# Patient Record
Sex: FEMALE | Race: BLACK OR AFRICAN AMERICAN | NOT HISPANIC OR LATINO | Employment: UNEMPLOYED | URBAN - METROPOLITAN AREA
[De-identification: names, ages, dates, MRNs, and addresses within clinical notes are randomized per-mention and may not be internally consistent; named-entity substitution may affect disease eponyms.]

---

## 2021-01-03 ENCOUNTER — HOSPITAL ENCOUNTER (EMERGENCY)
Facility: HOSPITAL | Age: 21
Discharge: HOME | End: 2021-01-04
Attending: EMERGENCY MEDICINE | Admitting: EMERGENCY MEDICINE

## 2021-01-03 DIAGNOSIS — Z3A.01 LESS THAN 8 WEEKS GESTATION OF PREGNANCY: ICD-10-CM

## 2021-01-03 DIAGNOSIS — F32.A DEPRESSION WITH SUICIDAL IDEATION: Primary | ICD-10-CM

## 2021-01-03 DIAGNOSIS — R45.851 DEPRESSION WITH SUICIDAL IDEATION: Primary | ICD-10-CM

## 2021-01-03 LAB
AMPHET+METHAMPHET UR QL: NEGATIVE
B-HCG UR QL: POSITIVE
BARBITURATES UR QL SCN: NEGATIVE
BENZODIAZ UR QL SCN: NEGATIVE
CANNABINOIDS SERPL QL: POSITIVE
COCAINE UR QL: NEGATIVE
ETHANOL BLD-MCNC: <10 MG/DL (ref 0–10)
ETHANOL UR QL: <0.01 %
HCG INTACT+B SERPL-ACNC: NORMAL MIU/ML
METHADONE UR QL SCN: NEGATIVE
OPIATES UR QL: NEGATIVE
OXYCODONE UR QL SCN: NEGATIVE

## 2021-01-03 PROCEDURE — 90791 PSYCH DIAGNOSTIC EVALUATION: CPT

## 2021-01-03 PROCEDURE — 84702 CHORIONIC GONADOTROPIN TEST: CPT | Performed by: EMERGENCY MEDICINE

## 2021-01-03 PROCEDURE — 80307 DRUG TEST PRSMV CHEM ANLYZR: CPT | Performed by: EMERGENCY MEDICINE

## 2021-01-03 PROCEDURE — 99284 EMERGENCY DEPT VISIT MOD MDM: CPT

## 2021-01-03 PROCEDURE — 81025 URINE PREGNANCY TEST: CPT | Performed by: EMERGENCY MEDICINE

## 2021-01-03 PROCEDURE — 82077 ASSAY SPEC XCP UR&BREATH IA: CPT | Performed by: EMERGENCY MEDICINE

## 2021-01-03 RX ORDER — PRENATAL VIT NO.126/IRON/FOLIC 28MG-0.8MG
1 TABLET ORAL DAILY
COMMUNITY

## 2021-01-03 NOTE — ED NOTES
Pt states she called because she has had thoughts of never wanting to wake up, states she would overdose or cut herself to accomplish. States last suicide attempt was approx 1 year ago.     Terri Albert RN  01/03/21 2233

## 2021-01-03 NOTE — ED PROVIDER NOTES
EMERGENCY DEPARTMENT ENCOUNTER    Room Number:  12/12  Date of encounter:  1/3/2021  PCP: Provider, No Known  Historian: Patient and EMS      HPI:  Chief Complaint: Depression with suicidal thoughts      Context: Mame Singh is a 20 y.o. female who presents to the ED c/o depression with suicidal thoughts.  The patient is a 20-year-old who is approximately 6 weeks pregnant with her first baby.  She states she feels overwhelmed now and is depressed and has been having suicidal thoughts.  She states she called for help because she does not want to hurt herself.  She denies any attempt to hurt herself at this point.  However she states in the past she has tried to hurt herself with cutting herself and overdoses.      PAST MEDICAL HISTORY  Active Ambulatory Problems     Diagnosis Date Noted   • No Active Ambulatory Problems     Resolved Ambulatory Problems     Diagnosis Date Noted   • No Resolved Ambulatory Problems     No Additional Past Medical History         PAST SURGICAL HISTORY  No past surgical history on file.      FAMILY HISTORY  No family history on file.      SOCIAL HISTORY  Social History     Socioeconomic History   • Marital status: Single     Spouse name: Not on file   • Number of children: Not on file   • Years of education: Not on file   • Highest education level: Not on file         ALLERGIES  Patient has no known allergies.        REVIEW OF SYSTEMS  Review of Systems     Patient denies headache, chest chest pain, abdominal pain, cough, fevers, chills, vomiting, diarrhea or known COVID-19 exposure    All systems reviewed and negative except for those discussed in HPI.     PHYSICAL EXAM    I have reviewed the triage vital signs and nursing notes.    ED Triage Vitals [01/03/21 1836]   Temp Heart Rate Resp BP SpO2   97.4 °F (36.3 °C) 70 16 127/80 98 %      Temp src Heart Rate Source Patient Position BP Location FiO2 (%)   Tympanic Monitor Sitting Right arm --       GENERAL: 20-year-old well developed,  well nourished in no acute distress  HENT: NCAT, neck supple, trachea midline  EYES: no scleral icterus, PERRL, normal conjunctiva  CV: regular rhythm, regular rate, no murmur  RESPIRATORY: unlabored effort, CTAB  ABDOMEN: soft, non-tender, non-distended, bowel sounds present  MUSCULOSKELETAL: no gross deformity, no pedal edema, no calf tenderness  NEURO: alert,  sensory and motor function of extremities grossly intact, speech clear, mental status normal  SKIN: warm, dry, no rash  PSYCH:  Appropriate mood and affect      PPE  Pt does not present with symptoms for COVID19; however, I was wearing a mask and goggles throughout all patient interaction.    Vital signs and nursing notes reviewed.      LAB RESULTS  Recent Results (from the past 24 hour(s))   Pregnancy, Urine - Urine, Clean Catch    Collection Time: 01/03/21  6:52 PM    Specimen: Urine, Clean Catch   Result Value Ref Range    HCG, Urine QL Positive (A) Negative   Urine Drug Screen - Urine, Clean Catch    Collection Time: 01/03/21  6:52 PM    Specimen: Urine, Clean Catch   Result Value Ref Range    Amphet/Methamphet, Screen Negative Negative    Barbiturates Screen, Urine Negative Negative    Benzodiazepine Screen, Urine Negative Negative    Cocaine Screen, Urine Negative Negative    Opiate Screen Negative Negative    THC, Screen, Urine Positive (A) Negative    Methadone Screen, Urine Negative Negative    Oxycodone Screen, Urine Negative Negative   Ethanol    Collection Time: 01/03/21  7:07 PM    Specimen: Blood   Result Value Ref Range    Ethanol <10 0 - 10 mg/dL    Ethanol % <0.010 %   hCG, Quantitative, Pregnancy    Collection Time: 01/03/21  7:07 PM    Specimen: Blood   Result Value Ref Range    HCG Quantitative 57,421.00 mIU/mL       Ordered the above labs and independently reviewed the results.        RADIOLOGY  No Radiology Exams Resulted Within Past 24 Hours    I ordered the above noted radiological studies. Independently reviewed by me and discussed  with radiologist.  See dictation above for official radiology interpretation.      PROCEDURES    Procedures        MEDICATIONS GIVEN IN ER    Medications - No data to display      PROGRESS, DATA ANALYSIS, CONSULTS, AND MEDICAL DECISION MAKING    All labs have been independently reviewed by me.  All radiology studies have been reviewed by me and discussed with radiologist dictating report.   EKG's independently reviewed by me.  Discussion below represents my analysis of pertinent findings related to patient's condition, differential diagnosis, treatment plan and final disposition.      ED Course as of Jan 03 2251   Sun Jan 03, 2021 1953 Patient's hCG confirms she is pregnant.  Her alcohol level is negative.  She has THC in her system.  Her vital signs are stable and currently we are awaiting access evaluation.    [GP]   2203 Patient has been seen and cleared by access.  Patient is no longer suicidal.  The patient agrees with outpatient psychiatric follow-up.    [GP]   2244 The patient's sister will not come pick the patient up.  The patient states she has no place to go.  Access has come back down and now is reevaluating the patient in order to try to find a safe discharge for her.  The patient think she can stay with her grandmother but so far we have been able to reach her grandmother.  Thus I will turn the patient over to my partner Dr. Berry awaiting a safe placement per access.    [GP]      ED Course User Index  [GP] Rod Ortega MD             AS OF 22:51 EST VITALS:    BP - 149/78  HR - 88  TEMP - 97.4 °F (36.3 °C) (Tympanic)  02 SATS - 100%        DIAGNOSIS  Final diagnoses:   Depression with suicidal ideation   Less than 8 weeks gestation of pregnancy         DISPOSITION  Pending per access        EMR Dragon/Transcription disclaimer:   Much of this encounter note is an electronic transcription/translation of spoken language to printed text.       No scribe was used     Rod Ortega MD  01/03/21  8839

## 2021-01-03 NOTE — ED NOTES
Pt presents to ED via EMS from home. Pt complains of depression, and is 6 week pregnant. Pt called a hotline and they were able to contact EMS.      Jazmín Lei RN  01/03/21 3305

## 2021-01-04 VITALS
TEMPERATURE: 97.4 F | OXYGEN SATURATION: 99 % | DIASTOLIC BLOOD PRESSURE: 85 MMHG | SYSTOLIC BLOOD PRESSURE: 142 MMHG | HEART RATE: 58 BPM | RESPIRATION RATE: 16 BRPM

## 2021-01-04 NOTE — CONSULTS
"Access consult for 20 year old female seen for suicidal thoughts. Pt is 6 weeks pregnant, and was aware of pregnancy prior to ED stay. Pt is excited for the pregnancy and is looking forward to being a mother. Pt is currently living with her sister, and has been for the past 2 days. Prior to that, the patient was living with her boyfriend in her boyfriend's mother's house.     Pt was pleasant and cooperative throughout interview. Pt stated she is here seeking resources for a new therapist and psychiatrist. Pt states that tonight she was feeling \"overwhelmed and down\" and that she was making suicidal statements, was upset and tearful. Pt states her sister called EMS to bring her to the hospital. Pt states that she recently broke up with her boyfriend, who is the father of her baby, and that she was \"kicked out\" of the boyfriend's mother's home yesterday. She then asked her sister for help and has been living with her for less than 2 days.     Pt stated that she does not currently see a therapist or a psychiatrist, and that her ex-boyfriend deterred her from seeing one previously. Pt states that in the spring of 2019 she had an inpatient stay at [facility] for suicidal thoughts with no plan or attempt. Pt states she was being followed by [facility] following that stay, and was seeing a psychiatrist there. Pt was prescribed psychiatric medication at that point, however, by the summer of 2019, she had stop seeing [facility] and had stopped the psychiatric medication.    Pt states that prior to yesterday, she has not had suicidal thoughts in over a year, and that she has never made an attempt or had a plan to end her life. Resources provided to the patient for therapists and psychiatrists. Dr. Ortega agrees with plan to discharge home with sister with outpatient resources.     Pt called sister to ask if she could come pick her up her, and the sister requested to speak with Access RN. Access RN spoke with sister, Mary Carmen, " [phone], and the sister expressed concern regarding the patient being discharged. Sister does not want the patient discharged, and stated she was just at [employer] last night for threatening to OD on a bottle of pills. Sister states she is not the primary person involved with the patient's care, and that their grandmother, Nancy Pedraza, would know more information. Attempted to call Nancy 4 times, no answer thus far.     Pt's sister is refusing to allow her to come back to live with her. Access currently awaiting on a call back from the grandmother to see if discharge home with her would be possible.     Spoke with representative from [facility], who stated they will send out a dispatcher to speak with the patient and see if she is a candidate for transfer. Representative spoke with patient's ED RN, Terri, and stated that the patient is not a candidate for acceptance at their shelter.     Spoke with Marquise at [facility] who stated that shelter would not be appropriate for this patient as she would need to be up at 5am every morning, and would need to be out of the building by 8pm every evening. Marquise gave this clinician the numbers for [facility], and the [facility].    Called both [facility] ([phone]) and [facility] ([phone]), no answer to both of the numbers.     Spoke with Juan M at [facility], who stated that there are beds available for the patient, and that she must have hospital discharge papers in hand when arriving to the facility.

## 2021-01-04 NOTE — PROGRESS NOTES
Clinical Pharmacy Services: Medication History    Mame Singh is a 20 y.o. female presenting to [facility] for   Chief Complaint   Patient presents with   • Depression       She  has no past medical history on file.    Allergies as of 01/03/2021   • (No Known Allergies)       Medication information was obtained from: Patient   Pharmacy and Phone Number:     [id number] - [place], [place] - [address] AT [facility] [address] - [phone]  - [phone]   [address]  [facility] [address]  Phone: [phone] Fax: [phone]        Prior to Admission Medications     Prescriptions Last Dose Informant Patient Reported? Taking?    prenatal vitamin (prenatal, CLASSIC, vitamin) tablet  Self Yes Yes    Take 1 tablet by mouth Daily.            Medication notes: Patient states she only takes a prenatal vitamin at home.     This medication list is complete to the best of my knowledge as of 1/3/2021    Please call if questions.    Rebekah Mckinley PharmD Candidate 2021  Medication History Technician  [phone]    1/3/2021 20:02 EST

## 2021-04-14 ENCOUNTER — APPOINTMENT (OUTPATIENT)
Dept: ULTRASOUND IMAGING | Facility: HOSPITAL | Age: 21
End: 2021-04-14

## 2021-04-14 ENCOUNTER — HOSPITAL ENCOUNTER (OUTPATIENT)
Facility: HOSPITAL | Age: 21
Discharge: HOME | End: 2021-04-14
Attending: OBSTETRICS & GYNECOLOGY | Admitting: OBSTETRICS & GYNECOLOGY

## 2021-04-14 VITALS
HEIGHT: 66 IN | SYSTOLIC BLOOD PRESSURE: 109 MMHG | HEART RATE: 95 BPM | WEIGHT: 214.95 LBS | TEMPERATURE: 98.9 F | OXYGEN SATURATION: 98 % | BODY MASS INDEX: 34.55 KG/M2 | RESPIRATION RATE: 16 BRPM | DIASTOLIC BLOOD PRESSURE: 69 MMHG

## 2021-04-14 PROBLEM — Z34.90 CURRENTLY PREGNANT: Status: ACTIVE | Noted: 2021-04-14

## 2021-04-14 LAB
BILIRUB UR QL STRIP: NEGATIVE
CLARITY UR: CLEAR
COLOR UR: YELLOW
GLUCOSE UR STRIP-MCNC: NEGATIVE MG/DL
HGB UR QL STRIP.AUTO: NEGATIVE
KETONES UR QL STRIP: NEGATIVE
LEUKOCYTE ESTERASE UR QL STRIP.AUTO: NEGATIVE
NITRITE UR QL STRIP: NEGATIVE
PH UR STRIP.AUTO: 7 [PH] (ref 5–8)
PROT UR QL STRIP: ABNORMAL
SP GR UR STRIP: 1.03 (ref 1–1.03)
UROBILINOGEN UR QL STRIP: ABNORMAL

## 2021-04-14 PROCEDURE — G0463 HOSPITAL OUTPT CLINIC VISIT: HCPCS

## 2021-04-14 PROCEDURE — 76817 TRANSVAGINAL US OBSTETRIC: CPT

## 2021-04-14 PROCEDURE — 81003 URINALYSIS AUTO W/O SCOPE: CPT | Performed by: OBSTETRICS & GYNECOLOGY

## 2021-04-14 RX ORDER — LIDOCAINE HYDROCHLORIDE 10 MG/ML
5 INJECTION, SOLUTION EPIDURAL; INFILTRATION; INTRACAUDAL; PERINEURAL AS NEEDED
Status: DISCONTINUED | OUTPATIENT
Start: 2021-04-14 | End: 2021-04-14

## 2021-04-14 RX ORDER — SODIUM CHLORIDE 0.9 % (FLUSH) 0.9 %
3 SYRINGE (ML) INJECTION EVERY 12 HOURS SCHEDULED
Status: DISCONTINUED | OUTPATIENT
Start: 2021-04-14 | End: 2021-04-14

## 2021-04-14 RX ORDER — SODIUM CHLORIDE 0.9 % (FLUSH) 0.9 %
10 SYRINGE (ML) INJECTION AS NEEDED
Status: DISCONTINUED | OUTPATIENT
Start: 2021-04-14 | End: 2021-04-14

## 2021-04-15 NOTE — NON STRESS TEST
Pt c/o pelvic cramping    toco - no ctxs  Nl FHTs    CL - 3.29 on u/s no funneling  Nl ua    Enc to push po fluids (pt with no water today),  F/up with regular care provider

## 2021-04-15 NOTE — SIGNIFICANT NOTE
Called Dr. Butt at this time regarding patient. Patient  21/5 weeks gestation. Patient goes to [facility] on [address] but does not know the name of her OB. Patient stated she received late prenatal care, and had 1 appointment in the last week. Patient came in tonight with complaints of cramping that started at 4pm. Patient rates her pain 5. Patient denies having intercourse, leaking of fluid, or bleeding. Baby category 1 on monitor heart rate 155 and no contractions. Patient stated she has had some difficulty urinating and her urine is dark. Patient has not had any water to drink today. Patient has history of suicidal thought 1 year ago, but none currently. Patient is staying at women[facility] shelter here in [place].  Orders given to Po hydrate, UA, and ultrasound for cervical length.

## 2021-07-16 ENCOUNTER — TELEPHONE (OUTPATIENT)
Dept: SOCIAL WORK | Facility: HOSPITAL | Age: 21
End: 2021-07-16

## 2021-07-16 NOTE — TELEPHONE ENCOUNTER
Informed by [employer] rep (Isa Javier) that patient recently moved from [place] to [place] and due next month. In need of OB appointment and was uncertain of who to contact. Confirmed that patient had previously had Passport, but cancelled policy - applied for [place] Medicaid HPE (#[id number], effective 7/9-8/31). Contacted [facility] (Spoke to Anahy, [phone]). Scheduled appointment for 8/3/21 @ 12:45pm, Tuesday. Assigned to Dr. Kenney, but will be seen this day by NP. If any earlier openings arise, then patient will be contacted. Contacted patient and patient's grandmother (Lu Altamirano, [phone]) to advised of appointment date/time/location.     Phone communication or documentation only - no physical contact with patient or family.  YAMILETH Knox    Phone: [phone]  Cell: [phone]  Fax: [phone]  [email or web address]

## 2021-07-26 ENCOUNTER — HOSPITAL ENCOUNTER (OUTPATIENT)
Facility: HOSPITAL | Age: 21
Discharge: HOME | End: 2021-07-27
Attending: OBSTETRICS & GYNECOLOGY | Admitting: OBSTETRICS & GYNECOLOGY

## 2021-07-26 PROBLEM — Z34.90 PREGNANT: Status: ACTIVE | Noted: 2021-07-26

## 2021-07-26 PROCEDURE — G0463 HOSPITAL OUTPT CLINIC VISIT: HCPCS

## 2021-07-26 RX ORDER — FERROUS SULFATE 325(65) MG
325 TABLET ORAL
COMMUNITY

## 2021-07-27 VITALS
HEART RATE: 85 BPM | TEMPERATURE: 98.4 F | HEIGHT: 67 IN | OXYGEN SATURATION: 100 % | SYSTOLIC BLOOD PRESSURE: 113 MMHG | WEIGHT: 230.38 LBS | DIASTOLIC BLOOD PRESSURE: 63 MMHG | RESPIRATION RATE: 18 BRPM | BODY MASS INDEX: 36.16 KG/M2

## 2021-07-27 LAB
BACTERIA UR QL AUTO: ABNORMAL /HPF
BILIRUB UR QL STRIP: NEGATIVE
CLARITY UR: ABNORMAL
COLOR UR: YELLOW
GLUCOSE UR STRIP-MCNC: NEGATIVE MG/DL
HGB UR QL STRIP.AUTO: ABNORMAL
HYALINE CASTS UR QL AUTO: ABNORMAL /LPF
KETONES UR QL STRIP: NEGATIVE
LEUKOCYTE ESTERASE UR QL STRIP.AUTO: NEGATIVE
NITRITE UR QL STRIP: NEGATIVE
PH UR STRIP.AUTO: 6.5 [PH] (ref 5–8)
PROT UR QL STRIP: NEGATIVE
RBC # UR: ABNORMAL /HPF
REF LAB TEST METHOD: ABNORMAL
SP GR UR STRIP: 1.03 (ref 1–1.03)
SQUAMOUS #/AREA URNS HPF: ABNORMAL /HPF
UROBILINOGEN UR QL STRIP: ABNORMAL
WBC UR QL AUTO: ABNORMAL /HPF

## 2021-07-27 PROCEDURE — 87086 URINE CULTURE/COLONY COUNT: CPT | Performed by: OBSTETRICS & GYNECOLOGY

## 2021-07-27 PROCEDURE — 81001 URINALYSIS AUTO W/SCOPE: CPT | Performed by: OBSTETRICS & GYNECOLOGY

## 2021-07-28 ENCOUNTER — ANESTHESIA EVENT (OUTPATIENT)
Dept: LABOR AND DELIVERY | Facility: HOSPITAL | Age: 21
End: 2021-07-28

## 2021-07-28 ENCOUNTER — ANESTHESIA (OUTPATIENT)
Dept: LABOR AND DELIVERY | Facility: HOSPITAL | Age: 21
End: 2021-07-28

## 2021-07-28 ENCOUNTER — HOSPITAL ENCOUNTER (INPATIENT)
Facility: HOSPITAL | Age: 21
LOS: 2 days | Discharge: HOME | End: 2021-07-30
Attending: OBSTETRICS & GYNECOLOGY | Admitting: OBSTETRICS & GYNECOLOGY

## 2021-07-28 PROBLEM — O47.9 UTERINE CONTRACTIONS DURING PREGNANCY: Status: ACTIVE | Noted: 2021-07-28

## 2021-07-28 LAB
ABO GROUP BLD: NORMAL
BACTERIA SPEC AEROBE CULT: NORMAL
BLD GP AB SCN SERPL QL: NEGATIVE
DEPRECATED RDW RBC AUTO: 38.9 FL (ref 37–54)
ERYTHROCYTE [DISTWIDTH] IN BLOOD BY AUTOMATED COUNT: 13.5 % (ref 12.3–15.4)
HCT VFR BLD AUTO: 36.4 % (ref 34–46.6)
HGB BLD-MCNC: 12.2 G/DL (ref 12–15.9)
HIV1+2 AB SER QL: NORMAL
MCH RBC QN AUTO: 27.8 PG (ref 26.6–33)
MCHC RBC AUTO-ENTMCNC: 33.5 G/DL (ref 31.5–35.7)
MCV RBC AUTO: 82.9 FL (ref 79–97)
PLATELET # BLD AUTO: 385 10*3/MM3 (ref 140–450)
PMV BLD AUTO: 7.6 FL (ref 6–12)
RBC # BLD AUTO: 4.39 10*6/MM3 (ref 3.77–5.28)
RH BLD: POSITIVE
SARS-COV-2 RNA PNL SPEC NAA+PROBE: NOT DETECTED
T&S EXPIRATION DATE: NORMAL
WBC # BLD AUTO: 22.6 10*3/MM3 (ref 3.4–10.8)

## 2021-07-28 PROCEDURE — 86901 BLOOD TYPING SEROLOGIC RH(D): CPT | Performed by: OBSTETRICS & GYNECOLOGY

## 2021-07-28 PROCEDURE — 86900 BLOOD TYPING SEROLOGIC ABO: CPT

## 2021-07-28 PROCEDURE — 86901 BLOOD TYPING SEROLOGIC RH(D): CPT

## 2021-07-28 PROCEDURE — 25010000002 FENTANYL CITRATE (PF) 250 MCG/5ML SOLUTION: Performed by: ANESTHESIOLOGY

## 2021-07-28 PROCEDURE — 25010000002 PENICILLIN G POTASSIUM PER 600000 UNITS: Performed by: OBSTETRICS & GYNECOLOGY

## 2021-07-28 PROCEDURE — 87635 SARS-COV-2 COVID-19 AMP PRB: CPT | Performed by: OBSTETRICS & GYNECOLOGY

## 2021-07-28 PROCEDURE — 86592 SYPHILIS TEST NON-TREP QUAL: CPT | Performed by: OBSTETRICS & GYNECOLOGY

## 2021-07-28 PROCEDURE — 86850 RBC ANTIBODY SCREEN: CPT | Performed by: OBSTETRICS & GYNECOLOGY

## 2021-07-28 PROCEDURE — 85027 COMPLETE CBC AUTOMATED: CPT | Performed by: OBSTETRICS & GYNECOLOGY

## 2021-07-28 PROCEDURE — 25010000003 PENICILLIN G POTASSIUM PER 600000 UNITS: Performed by: OBSTETRICS & GYNECOLOGY

## 2021-07-28 PROCEDURE — 0KQM0ZZ REPAIR PERINEUM MUSCLE, OPEN APPROACH: ICD-10-PCS | Performed by: OBSTETRICS & GYNECOLOGY

## 2021-07-28 PROCEDURE — G0432 EIA HIV-1/HIV-2 SCREEN: HCPCS | Performed by: OBSTETRICS & GYNECOLOGY

## 2021-07-28 PROCEDURE — 86900 BLOOD TYPING SEROLOGIC ABO: CPT | Performed by: OBSTETRICS & GYNECOLOGY

## 2021-07-28 PROCEDURE — 88307 TISSUE EXAM BY PATHOLOGIST: CPT | Performed by: OBSTETRICS & GYNECOLOGY

## 2021-07-28 PROCEDURE — 25010000002 MORPHINE PER 10 MG: Performed by: OBSTETRICS & GYNECOLOGY

## 2021-07-28 RX ORDER — FENTANYL 0.2 MG/100ML-BUPIV 0.125%-NACL 0.9% EPIDURAL INJ 2/0.125 MCG/ML-%
SOLUTION INJECTION CONTINUOUS
Status: DISCONTINUED | OUTPATIENT
Start: 2021-07-28 | End: 2021-07-29

## 2021-07-28 RX ORDER — ONDANSETRON 2 MG/ML
4 INJECTION INTRAMUSCULAR; INTRAVENOUS EVERY 6 HOURS PRN
Status: DISCONTINUED | OUTPATIENT
Start: 2021-07-28 | End: 2021-07-29 | Stop reason: HOSPADM

## 2021-07-28 RX ORDER — OXYTOCIN-SODIUM CHLORIDE 0.9% IV SOLN 30 UNIT/500ML 30-0.9/5 UT/ML-%
999 SOLUTION INTRAVENOUS ONCE
Status: COMPLETED | OUTPATIENT
Start: 2021-07-28 | End: 2021-07-28

## 2021-07-28 RX ORDER — EPHEDRINE SULFATE 50 MG/ML
10 INJECTION, SOLUTION INTRAVENOUS
Status: DISCONTINUED | OUTPATIENT
Start: 2021-07-28 | End: 2021-07-29 | Stop reason: HOSPADM

## 2021-07-28 RX ORDER — METHYLERGONOVINE MALEATE 0.2 MG/ML
200 INJECTION INTRAVENOUS ONCE AS NEEDED
Status: DISCONTINUED | OUTPATIENT
Start: 2021-07-28 | End: 2021-07-29 | Stop reason: HOSPADM

## 2021-07-28 RX ORDER — MAGNESIUM CARB/ALUMINUM HYDROX 105-160MG
30 TABLET,CHEWABLE ORAL ONCE AS NEEDED
Status: DISCONTINUED | OUTPATIENT
Start: 2021-07-28 | End: 2021-07-29 | Stop reason: HOSPADM

## 2021-07-28 RX ORDER — IBUPROFEN 600 MG/1
600 TABLET ORAL ONCE
Status: COMPLETED | OUTPATIENT
Start: 2021-07-28 | End: 2021-07-28

## 2021-07-28 RX ORDER — MORPHINE SULFATE 4 MG/ML
4 INJECTION, SOLUTION INTRAMUSCULAR; INTRAVENOUS ONCE AS NEEDED
Status: COMPLETED | OUTPATIENT
Start: 2021-07-28 | End: 2021-07-28

## 2021-07-28 RX ORDER — FAMOTIDINE 10 MG/ML
20 INJECTION, SOLUTION INTRAVENOUS ONCE AS NEEDED
Status: DISCONTINUED | OUTPATIENT
Start: 2021-07-28 | End: 2021-07-29 | Stop reason: HOSPADM

## 2021-07-28 RX ORDER — FENTANYL CITRATE 50 UG/ML
INJECTION, SOLUTION INTRAMUSCULAR; INTRAVENOUS
Status: DISPENSED
Start: 2021-07-28 | End: 2021-07-29

## 2021-07-28 RX ORDER — ONDANSETRON 4 MG/1
4 TABLET, FILM COATED ORAL EVERY 6 HOURS PRN
Status: DISCONTINUED | OUTPATIENT
Start: 2021-07-28 | End: 2021-07-29 | Stop reason: HOSPADM

## 2021-07-28 RX ORDER — MISOPROSTOL 200 UG/1
800 TABLET ORAL AS NEEDED
Status: DISCONTINUED | OUTPATIENT
Start: 2021-07-28 | End: 2021-07-29 | Stop reason: HOSPADM

## 2021-07-28 RX ORDER — TRISODIUM CITRATE DIHYDRATE AND CITRIC ACID MONOHYDRATE 500; 334 MG/5ML; MG/5ML
30 SOLUTION ORAL ONCE
Status: DISCONTINUED | OUTPATIENT
Start: 2021-07-28 | End: 2021-07-29 | Stop reason: HOSPADM

## 2021-07-28 RX ORDER — FENTANYL 0.2 MG/100ML-BUPIV 0.125%-NACL 0.9% EPIDURAL INJ 2/0.125 MCG/ML-%
SOLUTION INJECTION CONTINUOUS PRN
Status: DISCONTINUED | OUTPATIENT
Start: 2021-07-28 | End: 2021-07-28 | Stop reason: SURG

## 2021-07-28 RX ORDER — DIPHENHYDRAMINE HYDROCHLORIDE 50 MG/ML
12.5 INJECTION INTRAMUSCULAR; INTRAVENOUS EVERY 8 HOURS PRN
Status: DISCONTINUED | OUTPATIENT
Start: 2021-07-28 | End: 2021-07-29 | Stop reason: HOSPADM

## 2021-07-28 RX ORDER — SODIUM CHLORIDE, SODIUM LACTATE, POTASSIUM CHLORIDE, CALCIUM CHLORIDE 600; 310; 30; 20 MG/100ML; MG/100ML; MG/100ML; MG/100ML
125 INJECTION, SOLUTION INTRAVENOUS CONTINUOUS
Status: DISCONTINUED | OUTPATIENT
Start: 2021-07-28 | End: 2021-07-29

## 2021-07-28 RX ORDER — OXYTOCIN-SODIUM CHLORIDE 0.9% IV SOLN 30 UNIT/500ML 30-0.9/5 UT/ML-%
250 SOLUTION INTRAVENOUS CONTINUOUS
Status: DISPENSED | OUTPATIENT
Start: 2021-07-28 | End: 2021-07-28

## 2021-07-28 RX ORDER — FENTANYL 0.2 MG/100ML-BUPIV 0.125%-NACL 0.9% EPIDURAL INJ 2/0.125 MCG/ML-%
SOLUTION INJECTION
Status: COMPLETED
Start: 2021-07-28 | End: 2021-07-28

## 2021-07-28 RX ORDER — OXYTOCIN-SODIUM CHLORIDE 0.9% IV SOLN 30 UNIT/500ML 30-0.9/5 UT/ML-%
125 SOLUTION INTRAVENOUS CONTINUOUS PRN
Status: COMPLETED | OUTPATIENT
Start: 2021-07-28 | End: 2021-07-28

## 2021-07-28 RX ORDER — CARBOPROST TROMETHAMINE 250 UG/ML
250 INJECTION, SOLUTION INTRAMUSCULAR AS NEEDED
Status: DISCONTINUED | OUTPATIENT
Start: 2021-07-28 | End: 2021-07-29 | Stop reason: HOSPADM

## 2021-07-28 RX ORDER — FENTANYL CITRATE 50 UG/ML
INJECTION, SOLUTION INTRAMUSCULAR; INTRAVENOUS
Status: COMPLETED | OUTPATIENT
Start: 2021-07-28 | End: 2021-07-28

## 2021-07-28 RX ORDER — ONDANSETRON 2 MG/ML
4 INJECTION INTRAMUSCULAR; INTRAVENOUS ONCE AS NEEDED
Status: DISCONTINUED | OUTPATIENT
Start: 2021-07-28 | End: 2021-07-29 | Stop reason: HOSPADM

## 2021-07-28 RX ORDER — OXYTOCIN-SODIUM CHLORIDE 0.9% IV SOLN 30 UNIT/500ML 30-0.9/5 UT/ML-%
SOLUTION INTRAVENOUS
Status: COMPLETED
Start: 2021-07-28 | End: 2021-07-28

## 2021-07-28 RX ADMIN — SODIUM CHLORIDE 5 MILLION UNITS: 900 INJECTION INTRAVENOUS at 14:33

## 2021-07-28 RX ADMIN — WITCH HAZEL 1 PAD: 500 SOLUTION RECTAL; TOPICAL at 23:50

## 2021-07-28 RX ADMIN — Medication 10 ML/HR: at 16:01

## 2021-07-28 RX ADMIN — SODIUM CHLORIDE, POTASSIUM CHLORIDE, SODIUM LACTATE AND CALCIUM CHLORIDE 1000 ML: 600; 310; 30; 20 INJECTION, SOLUTION INTRAVENOUS at 14:25

## 2021-07-28 RX ADMIN — FENTANYL CITRATE 25 MCG: 0.05 INJECTION, SOLUTION INTRAMUSCULAR; INTRAVENOUS at 16:19

## 2021-07-28 RX ADMIN — OXYTOCIN-SODIUM CHLORIDE 0.9% IV SOLN 30 UNIT/500ML 999 MILLI-UNITS/MIN: 30-0.9/5 SOLUTION at 21:39

## 2021-07-28 RX ADMIN — IBUPROFEN 600 MG: 600 TABLET, FILM COATED ORAL at 22:59

## 2021-07-28 RX ADMIN — OXYTOCIN 125 ML/HR: 10 INJECTION INTRAVENOUS at 23:00

## 2021-07-28 RX ADMIN — LIDOCAINE HYDROCHLORIDE 3 ML: 10; .005 INJECTION, SOLUTION EPIDURAL; INFILTRATION; INTRACAUDAL; PERINEURAL at 16:01

## 2021-07-28 RX ADMIN — OXYTOCIN 999 MILLI-UNITS/MIN: 10 INJECTION INTRAVENOUS at 21:39

## 2021-07-28 RX ADMIN — Medication 1 APPLICATION: at 23:51

## 2021-07-28 RX ADMIN — SODIUM CHLORIDE, POTASSIUM CHLORIDE, SODIUM LACTATE AND CALCIUM CHLORIDE 999 ML/HR: 600; 310; 30; 20 INJECTION, SOLUTION INTRAVENOUS at 16:06

## 2021-07-28 RX ADMIN — MORPHINE SULFATE 4 MG: 4 INJECTION INTRAVENOUS at 15:04

## 2021-07-28 RX ADMIN — PENICILLIN G POTASSIUM 2.5 MILLION UNITS: 20000000 INJECTION, POWDER, FOR SOLUTION INTRAVENOUS at 19:15

## 2021-07-28 NOTE — ANESTHESIA PREPROCEDURE EVALUATION
Anesthesia Evaluation     Patient summary reviewed and Nursing notes reviewed   NPO Solid Status: > 8 hours  NPO Liquid Status: > 8 hours           Airway   Mallampati: II  TM distance: >3 FB  Neck ROM: full  No difficulty expected  Dental - normal exam     Pulmonary - negative pulmonary ROS and normal exam    breath sounds clear to auscultation  Cardiovascular - normal exam  Exercise tolerance: unable to assess    ECG reviewed  Rhythm: regular  Rate: normal    (+) hypertension,       Neuro/Psych- negative ROS  GI/Hepatic/Renal/Endo - negative ROS     Musculoskeletal (-) negative ROS    Abdominal  - normal exam   Substance History - negative use     OB/GYN    (+) Pregnant,         Other - negative ROS                       Anesthesia Plan    ASA 2     CSE     intravenous induction     Anesthetic plan, all risks, benefits, and alternatives have been provided, discussed and informed consent has been obtained with: patient.

## 2021-07-28 NOTE — ANESTHESIA PROCEDURE NOTES
CSE Block      Patient reassessed immediately prior to procedure    Patient location during procedure: OB  Reason for block: procedure for pain  Staffing  Anesthesiologist: Zeus Lutz MD  Preanesthetic Checklist  Completed: patient identified, IV checked, site marked, risks and benefits discussed, surgical consent, monitors and equipment checked, pre-op evaluation and timeout performed  CSE  Patient position: sitting  Patient monitoring: blood pressure monitoring, continuous pulse oximetry and EKG  Procedures: landmark technique and palpation technique  Spinal Needle  Needle type: Sprotte tip   Needle gauge: 27 G  Approach: midline  Location: L3-4  Fluid Appearance: clear    Epidural Needle  Injection technique: RODRIGUEZ saline  Needle type: MiniMonos   Needle gauge: 18 G  Location: L3-L4  Loss of Resistance: 5cm  Cath Depth at Skin (cm): 10  Aspiration: negative  Test dose: negative  Epidural medications: fentaNYL Citrate (PF) (SUBLIMAZE) injection, 25 mcg    Catheter  Catheter type: end hole  Catheter size: 20 G  Assessment  Dressing:occlusive dressing applied and secured with tape  Pt Tolerance:patient tolerated the procedure well with no apparent complications  Complications:no  Additional Notes  Lido 2% used for skin local.  Lido 1.5% 3 ml used for test Dose.  Fentanyl 25mcg for intrathecal narcotic dose.  75 mcg wasted at end of case w RN.

## 2021-07-29 LAB
BASOPHILS # BLD AUTO: 0.1 10*3/MM3 (ref 0–0.2)
BASOPHILS NFR BLD AUTO: 0.4 % (ref 0–1.5)
DEPRECATED RDW RBC AUTO: 39.8 FL (ref 37–54)
EOSINOPHIL # BLD AUTO: 0 10*3/MM3 (ref 0–0.4)
EOSINOPHIL NFR BLD AUTO: 0.1 % (ref 0.3–6.2)
ERYTHROCYTE [DISTWIDTH] IN BLOOD BY AUTOMATED COUNT: 13.7 % (ref 12.3–15.4)
HCT VFR BLD AUTO: 32.2 % (ref 34–46.6)
HGB BLD-MCNC: 10.6 G/DL (ref 12–15.9)
LYMPHOCYTES # BLD AUTO: 2.6 10*3/MM3 (ref 0.7–3.1)
LYMPHOCYTES NFR BLD AUTO: 9.4 % (ref 19.6–45.3)
MCH RBC QN AUTO: 27.2 PG (ref 26.6–33)
MCHC RBC AUTO-ENTMCNC: 32.8 G/DL (ref 31.5–35.7)
MCV RBC AUTO: 83 FL (ref 79–97)
MONOCYTES # BLD AUTO: 1.7 10*3/MM3 (ref 0.1–0.9)
MONOCYTES NFR BLD AUTO: 6.3 % (ref 5–12)
NEUTROPHILS NFR BLD AUTO: 23.2 10*3/MM3 (ref 1.7–7)
NEUTROPHILS NFR BLD AUTO: 83.8 % (ref 42.7–76)
NRBC BLD AUTO-RTO: 0 /100 WBC (ref 0–0.2)
PLATELET # BLD AUTO: 330 10*3/MM3 (ref 140–450)
PMV BLD AUTO: 7.7 FL (ref 6–12)
RBC # BLD AUTO: 3.88 10*6/MM3 (ref 3.77–5.28)
RPR SER QL: NORMAL
WBC # BLD AUTO: 27.6 10*3/MM3 (ref 3.4–10.8)

## 2021-07-29 PROCEDURE — 90471 IMMUNIZATION ADMIN: CPT | Performed by: OBSTETRICS & GYNECOLOGY

## 2021-07-29 PROCEDURE — 25010000002 TDAP 5-2.5-18.5 LF-MCG/0.5 SUSPENSION: Performed by: OBSTETRICS & GYNECOLOGY

## 2021-07-29 PROCEDURE — 85025 COMPLETE CBC W/AUTO DIFF WBC: CPT | Performed by: OBSTETRICS & GYNECOLOGY

## 2021-07-29 PROCEDURE — 90715 TDAP VACCINE 7 YRS/> IM: CPT | Performed by: OBSTETRICS & GYNECOLOGY

## 2021-07-29 RX ORDER — SODIUM CHLORIDE 0.9 % (FLUSH) 0.9 %
1-10 SYRINGE (ML) INJECTION AS NEEDED
Status: DISCONTINUED | OUTPATIENT
Start: 2021-07-29 | End: 2021-07-30 | Stop reason: HOSPADM

## 2021-07-29 RX ORDER — BISACODYL 10 MG
10 SUPPOSITORY, RECTAL RECTAL DAILY PRN
Status: DISCONTINUED | OUTPATIENT
Start: 2021-07-29 | End: 2021-07-30 | Stop reason: HOSPADM

## 2021-07-29 RX ORDER — LANOLIN 100 %
OINTMENT (GRAM) TOPICAL
Status: DISCONTINUED | OUTPATIENT
Start: 2021-07-29 | End: 2021-07-30 | Stop reason: HOSPADM

## 2021-07-29 RX ORDER — CALCIUM CARBONATE 200(500)MG
2 TABLET,CHEWABLE ORAL 3 TIMES DAILY PRN
Status: DISCONTINUED | OUTPATIENT
Start: 2021-07-29 | End: 2021-07-30 | Stop reason: HOSPADM

## 2021-07-29 RX ORDER — ONDANSETRON 4 MG/1
4 TABLET, FILM COATED ORAL EVERY 8 HOURS PRN
Status: DISCONTINUED | OUTPATIENT
Start: 2021-07-29 | End: 2021-07-30 | Stop reason: HOSPADM

## 2021-07-29 RX ORDER — HYDROCORTISONE ACETATE PRAMOXINE HCL 2.5; 1 G/100G; G/100G
1 CREAM TOPICAL AS NEEDED
Status: DISCONTINUED | OUTPATIENT
Start: 2021-07-29 | End: 2021-07-30 | Stop reason: HOSPADM

## 2021-07-29 RX ORDER — DOCUSATE SODIUM 100 MG/1
100 CAPSULE, LIQUID FILLED ORAL 2 TIMES DAILY
Status: DISCONTINUED | OUTPATIENT
Start: 2021-07-29 | End: 2021-07-30 | Stop reason: HOSPADM

## 2021-07-29 RX ORDER — OXYCODONE HYDROCHLORIDE 5 MG/1
10 TABLET ORAL EVERY 4 HOURS PRN
Status: DISCONTINUED | OUTPATIENT
Start: 2021-07-29 | End: 2021-07-30 | Stop reason: HOSPADM

## 2021-07-29 RX ORDER — IBUPROFEN 600 MG/1
600 TABLET ORAL EVERY 6 HOURS PRN
Status: DISCONTINUED | OUTPATIENT
Start: 2021-07-29 | End: 2021-07-30 | Stop reason: HOSPADM

## 2021-07-29 RX ORDER — PRENATAL VIT/IRON FUM/FOLIC AC 27MG-0.8MG
1 TABLET ORAL DAILY
Status: DISCONTINUED | OUTPATIENT
Start: 2021-07-29 | End: 2021-07-30 | Stop reason: HOSPADM

## 2021-07-29 RX ORDER — HYDROCODONE BITARTRATE AND ACETAMINOPHEN 5; 325 MG/1; MG/1
1 TABLET ORAL EVERY 4 HOURS PRN
Status: DISCONTINUED | OUTPATIENT
Start: 2021-07-29 | End: 2021-07-30 | Stop reason: HOSPADM

## 2021-07-29 RX ADMIN — TETANUS TOXOID, REDUCED DIPHTHERIA TOXOID AND ACELLULAR PERTUSSIS VACCINE, ADSORBED 0.5 ML: 5; 2.5; 8; 8; 2.5 SUSPENSION INTRAMUSCULAR at 17:13

## 2021-07-29 RX ADMIN — IBUPROFEN 600 MG: 600 TABLET, FILM COATED ORAL at 08:46

## 2021-07-29 RX ADMIN — DOCUSATE SODIUM 100 MG: 100 CAPSULE, LIQUID FILLED ORAL at 12:57

## 2021-07-29 RX ADMIN — PRENATAL VITAMINS-IRON FUMARATE 27 MG IRON-FOLIC ACID 0.8 MG TABLET 1 TABLET: at 08:46

## 2021-07-29 RX ADMIN — DOCUSATE SODIUM 100 MG: 100 CAPSULE, LIQUID FILLED ORAL at 20:02

## 2021-07-29 NOTE — ANESTHESIA POSTPROCEDURE EVALUATION
Patient: Mame Singh    Procedure Summary     Date: 07/28/21 Room / Location:     Anesthesia Start: 1552 Anesthesia Stop: 2231    Procedure: LABOR ANALGESIA Diagnosis:     Scheduled Providers:  Provider: Zeus Lutz MD    Anesthesia Type: CSE ASA Status: 2          Anesthesia Type: CSE    Vitals  Vitals Value Taken Time   /55 07/28/21 2231   Temp 98.6 °F (37 °C) 07/28/21 2200   Pulse 86 07/28/21 2231   Resp 18 07/28/21 2200   SpO2 100 % 07/28/21 2229   Vitals shown include unvalidated device data.        Post Anesthesia Care and Evaluation    Patient location during evaluation: PACU  Patient participation: complete - patient participated  Level of consciousness: awake  Pain scale: See nurse's notes for pain score.  Pain management: adequate  Airway patency: patent  Anesthetic complications: No anesthetic complications  PONV Status: none  Cardiovascular status: acceptable  Respiratory status: acceptable  Hydration status: acceptable  Post Neuraxial Block status: Motor and sensory function returned to baseline and No signs or symptoms of PDPH  Comments: Patient seen and examined postoperatively; vital signs stable; SpO2 greater than or equal to 90%; cardiopulmonary status stable; nausea/vomiting adequately controlled; pain adequately controlled; no apparent anesthesia complications; patient discharged from anesthesia care when discharge criteria were met

## 2021-07-30 VITALS
DIASTOLIC BLOOD PRESSURE: 83 MMHG | BODY MASS INDEX: 36.16 KG/M2 | WEIGHT: 230.38 LBS | HEIGHT: 67 IN | RESPIRATION RATE: 17 BRPM | SYSTOLIC BLOOD PRESSURE: 124 MMHG | OXYGEN SATURATION: 100 % | HEART RATE: 77 BPM | TEMPERATURE: 97.8 F

## 2021-07-30 LAB
BASOPHILS # BLD AUTO: 0.1 10*3/MM3 (ref 0–0.2)
BASOPHILS NFR BLD AUTO: 0.4 % (ref 0–1.5)
DEPRECATED RDW RBC AUTO: 38.5 FL (ref 37–54)
EOSINOPHIL # BLD AUTO: 0.1 10*3/MM3 (ref 0–0.4)
EOSINOPHIL NFR BLD AUTO: 0.7 % (ref 0.3–6.2)
ERYTHROCYTE [DISTWIDTH] IN BLOOD BY AUTOMATED COUNT: 13.5 % (ref 12.3–15.4)
HCT VFR BLD AUTO: 32.7 % (ref 34–46.6)
HGB BLD-MCNC: 10.9 G/DL (ref 12–15.9)
LAB AP CASE REPORT: NORMAL
LYMPHOCYTES # BLD AUTO: 3.5 10*3/MM3 (ref 0.7–3.1)
LYMPHOCYTES NFR BLD AUTO: 20.5 % (ref 19.6–45.3)
MCH RBC QN AUTO: 27.7 PG (ref 26.6–33)
MCHC RBC AUTO-ENTMCNC: 33.2 G/DL (ref 31.5–35.7)
MCV RBC AUTO: 83.3 FL (ref 79–97)
MONOCYTES # BLD AUTO: 0.8 10*3/MM3 (ref 0.1–0.9)
MONOCYTES NFR BLD AUTO: 4.7 % (ref 5–12)
NEUTROPHILS NFR BLD AUTO: 12.4 10*3/MM3 (ref 1.7–7)
NEUTROPHILS NFR BLD AUTO: 73.7 % (ref 42.7–76)
NRBC BLD AUTO-RTO: 0.1 /100 WBC (ref 0–0.2)
PATH REPORT.FINAL DX SPEC: NORMAL
PATH REPORT.GROSS SPEC: NORMAL
PLATELET # BLD AUTO: 328 10*3/MM3 (ref 140–450)
PMV BLD AUTO: 7.6 FL (ref 6–12)
RBC # BLD AUTO: 3.93 10*6/MM3 (ref 3.77–5.28)
WBC # BLD AUTO: 16.9 10*3/MM3 (ref 3.4–10.8)

## 2021-07-30 PROCEDURE — 85025 COMPLETE CBC W/AUTO DIFF WBC: CPT | Performed by: NURSE PRACTITIONER

## 2021-07-30 RX ORDER — IBUPROFEN 600 MG/1
600 TABLET ORAL EVERY 6 HOURS PRN
Qty: 30 TABLET | Refills: 0 | Status: SHIPPED | OUTPATIENT
Start: 2021-07-30

## 2021-07-30 RX ADMIN — DOCUSATE SODIUM 100 MG: 100 CAPSULE, LIQUID FILLED ORAL at 08:34

## 2021-07-30 RX ADMIN — PRENATAL VITAMINS-IRON FUMARATE 27 MG IRON-FOLIC ACID 0.8 MG TABLET 1 TABLET: at 08:34

## 2021-07-30 RX ADMIN — Medication 1 APPLICATION: at 13:30

## 2021-07-30 RX ADMIN — WITCH HAZEL 1 PAD: 500 SOLUTION RECTAL; TOPICAL at 13:30

## 2021-08-02 NOTE — PROGRESS NOTES
Case Management Discharge Note                Selected Continued Care - Discharged on 7/30/2021 Admission date: 7/28/2021 - Discharge disposition: Home or Self Care    Destination    No services have been selected for the patient.              Durable Medical Equipment    No services have been selected for the patient.              Dialysis/Infusion    No services have been selected for the patient.              Home Medical Care    No services have been selected for the patient.              Therapy    No services have been selected for the patient.              Community Resources    No services have been selected for the patient.              Community & DME    No services have been selected for the patient.                       Final Discharge Disposition Code: 01 - home or self-care

## 2023-06-22 PROBLEM — Z87.59 HISTORY OF GESTATIONAL HYPERTENSION: Status: ACTIVE | Noted: 2023-06-22

## 2023-06-22 PROBLEM — R05.3 CHRONIC COUGH: Status: ACTIVE | Noted: 2023-06-22

## 2023-06-22 PROBLEM — Z00.00 PREVENTATIVE HEALTH CARE: Status: ACTIVE | Noted: 2023-06-22

## 2023-06-22 PROBLEM — H65.92 MIDDLE EAR EFFUSION, LEFT: Status: ACTIVE | Noted: 2023-06-22

## 2023-09-26 ENCOUNTER — OFFICE VISIT (OUTPATIENT)
Dept: FAMILY MEDICINE CLINIC | Facility: CLINIC | Age: 23
End: 2023-09-26
Payer: MEDICAID

## 2023-09-26 VITALS
BODY MASS INDEX: 33.83 KG/M2 | WEIGHT: 216 LBS | OXYGEN SATURATION: 100 % | DIASTOLIC BLOOD PRESSURE: 68 MMHG | HEART RATE: 92 BPM | SYSTOLIC BLOOD PRESSURE: 124 MMHG

## 2023-09-26 DIAGNOSIS — Z01.419 WELL WOMAN EXAM WITH ROUTINE GYNECOLOGICAL EXAM: ICD-10-CM

## 2023-09-26 DIAGNOSIS — R22.32 NODULE OF FINGER OF LEFT HAND: ICD-10-CM

## 2023-09-26 DIAGNOSIS — Z11.3 ROUTINE SCREENING FOR STI (SEXUALLY TRANSMITTED INFECTION): Primary | ICD-10-CM

## 2023-09-26 LAB
C TRACH RRNA CVX QL NAA+PROBE: NOT DETECTED
N GONORRHOEA RRNA SPEC QL NAA+PROBE: NOT DETECTED

## 2023-09-26 PROCEDURE — 87491 CHLMYD TRACH DNA AMP PROBE: CPT | Performed by: NURSE PRACTITIONER

## 2023-09-26 PROCEDURE — 87591 N.GONORRHOEAE DNA AMP PROB: CPT | Performed by: NURSE PRACTITIONER

## 2023-09-26 NOTE — PROGRESS NOTES
"Chief Complaint  Gynecologic Exam (Annual exam with pap smear ) and Cyst (Knot on finger/left hand that has been there a few days. Painful to the touch )    Subjective        Mame Singh presents to [facility]  History of Present Illness    Patient presents for Annual Exam with Pap smear. She reports having a period approximately every 3 months. Nexplanon in place. Patient has not had prior pap smear. She denies vaginal discharge, itching or pain. Patient is sexually active with one partner, denies acute concerns regarding STI.  She has no complaints of dizziness, headache, chest pain, cough or dyspnea.  Patient has no GI complaints.  Patient does c/o painful knot to left hand, near 3rd digit. She reports presented a few days ago without trauma.       Objective   Vital Signs:  /68 (BP Location: Left arm, Patient Position: Sitting, Cuff Size: Large Adult)   Pulse 92   Wt 98 kg (216 lb)   SpO2 100%   BMI 33.83 kg/m²   Estimated body mass index is 33.83 kg/m² as calculated from the following:    Height as of 6/22/23: 170.2 cm (67\").    Weight as of this encounter: 98 kg (216 lb).            Physical Exam  Exam conducted with a chaperone present.   Constitutional:       Appearance: Normal appearance.   HENT:      Head: Normocephalic.   Cardiovascular:      Rate and Rhythm: Normal rate and regular rhythm.   Pulmonary:      Effort: Pulmonary effort is normal.      Breath sounds: Normal breath sounds.   Chest:   Breasts:     Right: Normal.      Left: Normal.   Abdominal:      General: Abdomen is flat. Bowel sounds are normal.      Palpations: Abdomen is soft.   Genitourinary:     Labia:         Right: No rash.         Left: No rash.       Vagina: Normal.      Cervix: Cervical bleeding present.      Adnexa: Right adnexa normal and left adnexa normal.      Rectum: Normal.   Musculoskeletal:         General: Normal range of motion.        Arms:       Cervical back: Neck supple.    "   Right lower leg: No edema.      Left lower leg: No edema.   Skin:     General: Skin is warm and dry.   Neurological:      Mental Status: She is alert and oriented to person, place, and time.      Gait: Gait is intact.   Psychiatric:         Attention and Perception: Attention normal.         Mood and Affect: Mood normal.         Speech: Speech normal.      Result Review :                   Assessment and Plan   Diagnoses and all orders for this visit:    1. Routine screening for STI (sexually transmitted infection) (Primary)  -     Chlamydia trachomatis, Neisseria gonorrhoeae, PCR - Urine, Urine, Clean Catch    2. Nodule of finger of left hand  -     XR Hand 3+ View Left; Future    3. Well woman exam with routine gynecological exam  Assessment & Plan:  Tolerated well without complications, will notify patient of results when available  Well-balanced diet recommended  CDC recommends 150 minutes of exercise weekly    Orders:  -     IGP, Rfx Aptima HPV ASCU           I spent 30 minutes caring for Mame on this date of service. This time includes time spent by me in the following activities:preparing for the visit, obtaining and/or reviewing a separately obtained history, performing a medically appropriate examination and/or evaluation , counseling and educating the patient/family/caregiver, ordering medications, tests, or procedures, documenting information in the medical record, and care coordination  Follow Up   Return in about 1 year (around 9/26/2024) for Annual physical.  Patient was given instructions and counseling regarding her condition or for health maintenance advice. Please see specific information pulled into the AVS if appropriate.     Counseling was given to patient for the following topics: diagnostic results, instructions for management, risk factor reductions, prognosis, patient and family education, impressions, risks and benefits of treatment options, and importance of treatment compliance .  Total time of the encounter was 22 minutes and 5 minutes was spent counseling.

## 2023-09-26 NOTE — ASSESSMENT & PLAN NOTE
Tolerated well without complications, will notify patient of results when available  Well-balanced diet recommended  CDC recommends 150 minutes of exercise weekly

## 2023-10-02 LAB
CONV .: NORMAL
CYTOLOGIST CVX/VAG CYTO: NORMAL
CYTOLOGY CVX/VAG DOC CYTO: NORMAL
CYTOLOGY CVX/VAG DOC THIN PREP: NORMAL
DX ICD CODE: NORMAL
HIV 1 & 2 AB SER-IMP: NORMAL
OTHER STN SPEC: NORMAL
STAT OF ADQ CVX/VAG CYTO-IMP: NORMAL

## 2024-03-17 ENCOUNTER — HOSPITAL ENCOUNTER (EMERGENCY)
Facility: HOSPITAL | Age: 24
Discharge: HOME | End: 2024-03-17
Attending: EMERGENCY MEDICINE | Admitting: EMERGENCY MEDICINE
Payer: MEDICAID

## 2024-03-17 VITALS
HEIGHT: 67 IN | BODY MASS INDEX: 33.74 KG/M2 | DIASTOLIC BLOOD PRESSURE: 93 MMHG | OXYGEN SATURATION: 99 % | TEMPERATURE: 97.6 F | HEART RATE: 86 BPM | SYSTOLIC BLOOD PRESSURE: 120 MMHG | WEIGHT: 215 LBS | RESPIRATION RATE: 18 BRPM

## 2024-03-17 DIAGNOSIS — N76.0 BACTERIAL VAGINOSIS: ICD-10-CM

## 2024-03-17 DIAGNOSIS — B96.89 BACTERIAL VAGINOSIS: ICD-10-CM

## 2024-03-17 DIAGNOSIS — M54.9 ACUTE MIDLINE BACK PAIN, UNSPECIFIED BACK LOCATION: Primary | ICD-10-CM

## 2024-03-17 LAB
B-HCG UR QL: NEGATIVE
BILIRUB UR QL STRIP: NEGATIVE
C TRACH RRNA CVX QL NAA+PROBE: NOT DETECTED
CLARITY UR: CLEAR
CLUE CELLS SPEC QL WET PREP: ABNORMAL
COLOR UR: YELLOW
GLUCOSE UR STRIP-MCNC: NEGATIVE MG/DL
HGB UR QL STRIP.AUTO: NEGATIVE
HYDATID CYST SPEC WET PREP: ABNORMAL
KETONES UR QL STRIP: NEGATIVE
LEUKOCYTE ESTERASE UR QL STRIP.AUTO: ABNORMAL
N GONORRHOEA RRNA SPEC QL NAA+PROBE: NOT DETECTED
NITRITE UR QL STRIP: NEGATIVE
PH UR STRIP.AUTO: 6 [PH] (ref 5–8)
PROT UR QL STRIP: NEGATIVE
SP GR UR STRIP: 1.02 (ref 1–1.03)
T VAGINALIS SPEC QL WET PREP: ABNORMAL
UROBILINOGEN UR QL STRIP: ABNORMAL
WBC SPEC QL WET PREP: ABNORMAL
YEAST GENITAL QL WET PREP: ABNORMAL

## 2024-03-17 PROCEDURE — 87591 N.GONORRHOEAE DNA AMP PROB: CPT | Performed by: EMERGENCY MEDICINE

## 2024-03-17 PROCEDURE — 81003 URINALYSIS AUTO W/O SCOPE: CPT | Performed by: EMERGENCY MEDICINE

## 2024-03-17 PROCEDURE — 96372 THER/PROPH/DIAG INJ SC/IM: CPT

## 2024-03-17 PROCEDURE — 25010000002 KETOROLAC TROMETHAMINE PER 15 MG: Performed by: EMERGENCY MEDICINE

## 2024-03-17 PROCEDURE — 99283 EMERGENCY DEPT VISIT LOW MDM: CPT

## 2024-03-17 PROCEDURE — 81025 URINE PREGNANCY TEST: CPT | Performed by: EMERGENCY MEDICINE

## 2024-03-17 PROCEDURE — 87210 SMEAR WET MOUNT SALINE/INK: CPT | Performed by: EMERGENCY MEDICINE

## 2024-03-17 PROCEDURE — 87491 CHLMYD TRACH DNA AMP PROBE: CPT | Performed by: EMERGENCY MEDICINE

## 2024-03-17 RX ORDER — KETOROLAC TROMETHAMINE 30 MG/ML
30 INJECTION, SOLUTION INTRAMUSCULAR; INTRAVENOUS ONCE
Status: COMPLETED | OUTPATIENT
Start: 2024-03-17 | End: 2024-03-17

## 2024-03-17 RX ORDER — METRONIDAZOLE 500 MG/1
500 TABLET ORAL 2 TIMES DAILY
Qty: 14 TABLET | Refills: 0 | Status: SHIPPED | OUTPATIENT
Start: 2024-03-17

## 2024-03-17 RX ORDER — MELOXICAM 15 MG/1
15 TABLET ORAL DAILY
Qty: 10 TABLET | Refills: 0 | Status: SHIPPED | OUTPATIENT
Start: 2024-03-17

## 2024-03-17 RX ORDER — METHOCARBAMOL 750 MG/1
750 TABLET, FILM COATED ORAL 4 TIMES DAILY PRN
Qty: 12 TABLET | Refills: 0 | Status: SHIPPED | OUTPATIENT
Start: 2024-03-17

## 2024-03-17 RX ADMIN — KETOROLAC TROMETHAMINE 30 MG: 30 INJECTION, SOLUTION INTRAMUSCULAR at 13:54

## 2024-03-17 NOTE — ED PROVIDER NOTES
Subjective   History of Present Illness  -year-old female presents after pulling a heavy blanket out of washer machine when developed back pain.  She complains of pain mid back.  Worse with movement.  She has no complaints of pain into her legs numbness weakness.  She reports she has had some urinary frequency the last few days and vaginal discharge.  She has had no fever no cough or shortness of breath.  She has had some mild lower abdominal pain.  Review of Systems    Past Medical History:   Diagnosis Date    Chlamydia     Gestational hypertension        No Known Allergies    Past Surgical History:   Procedure Laterality Date    WISDOM TOOTH EXTRACTION         Family History   Problem Relation Age of Onset    No Known Problems Mother     No Known Problems Father        Social History     Socioeconomic History    Marital status: Single   Tobacco Use    Smoking status: Every Day     Current packs/day: 0.25     Average packs/day: 0.3 packs/day for 2.0 years (0.5 ttl pk-yrs)     Types: Cigarettes     Passive exposure: Never    Smokeless tobacco: Never   Vaping Use    Vaping status: Never Used   Substance and Sexual Activity    Alcohol use: Not Currently    Drug use: Never    Sexual activity: Defer     Prior to Admission medications    Medication Sig Start Date End Date Taking? Authorizing Provider   Etonogestrel (NEXPLANON SC) Inject  under the skin into the appropriate area as directed.    Provider, MD Ashley           Objective   Physical Exam  21-year-old female awake alert.  Generally well-developed well-nourished.  Chest clear equal breath sounds cardiovascular rate and rhythm abdomen is soft without mass rebound or guarding complains of some pain mid back.  Examination of legs neurovasc intact without deficit reflexes 2+ brisk and symmetric pelvic exam reveals small amount of whitish discharge in vault.  She has no lower abdominal tenderness on palpation.  Procedures           ED Course      Results for  "orders placed or performed during the hospital encounter of 03/17/24   Chlamydia trachomatis, Neisseria gonorrhoeae, PCR - Swab, Urine, Random Void    Specimen: Urine, Random Void; Swab   Result Value Ref Range    Chlamydia DNA by PCR Not Detected Not Detected, Invalid    Neisseria gonorrhoeae by PCR Not Detected Not Detected   Wet Prep, Genital - Swab, Vagina    Specimen: Vagina; Swab   Result Value Ref Range    YEAST No yeast seen No yeast seen    HYPHAL ELEMENTS No Hyphal elements seen No Hyphal elements seen    WBC'S No WBC's seen No WBC's seen    Clue Cells, Wet Prep 1+ Clue cells seen (A) No Clue cells seen    Trichomonas, Wet Prep No Trichomonas seen No Trichomonas seen   Pregnancy, Urine - Urine, Clean Catch    Specimen: Urine, Clean Catch   Result Value Ref Range    HCG, Urine QL Negative Negative   Urinalysis With Microscopic If Indicated (No Culture) - Urine, Clean Catch    Specimen: Urine, Clean Catch   Result Value Ref Range    Color, UA Yellow Yellow, Straw    Appearance, UA Clear Clear    pH, UA 6.0 5.0 - 8.0    Specific Gravity, UA 1.025 1.005 - 1.030    Glucose, UA Negative Negative    Ketones, UA Negative Negative    Bilirubin, UA Negative Negative    Blood, UA Negative Negative    Protein, UA Negative Negative    Leuk Esterase, UA Trace (A) Negative    Nitrite, UA Negative Negative    Urobilinogen, UA 0.2 E.U./dL 0.2 - 1.0 E.U./dL     No radiology results for the last day  Medications   ketorolac (TORADOL) injection 30 mg (30 mg Intramuscular Given 3/17/24 1354)     /90   Pulse 101   Temp 97.6 °F (36.4 °C) (Oral)   Resp 18   Ht 170.2 cm (67\")   Wt 97.5 kg (215 lb)   SpO2 100%   BMI 33.67 kg/m²                                          Medical Decision Making  Amount and/or Complexity of Data Reviewed  Labs: ordered.    Chart review: Patient had primary care visit with routine screening for STI September of last year which was negative for GC chlamydia  Comorbidity: As per past history "   Differential: Musculoskeletal pain, UTI, vaginitis,  My EKG interpretation: Not indicated  Lab: Wet prep positive for clue cells no trichomonas or yeast.  GC chlamydia not detected urinalysis trace leukocytes otherwise normal urine hCG negative  My Radiology review and interpretation: Not felt to be necessary based on history and exam  Discussion/treatment: Patient was given injection of ketorolac for pain.  Findings were discussed with her.  She was discharged placed on Flagyl and Mobic for pain.  She was also given Robaxin for spasm.  Advised can also use Salonpas pain patch to painful area.  She may do activity as tolerated.  Follow-up with primary provider, return new or worsening symptoms  Patient was evaluated using appropriate PPE      Final diagnoses:   Acute midline back pain, unspecified back location   Bacterial vaginosis       ED Disposition  ED Disposition       ED Disposition   Discharge    Condition   Stable    Comment   --               Gilda Fong APRN  [address]  [address]  [place] [address] [phone]               Medication List        New Prescriptions      meloxicam 15 MG tablet  Commonly known as: Mobic  Take 1 tablet by mouth Daily. Prn pain     methocarbamol 750 MG tablet  Commonly known as: ROBAXIN  Take 1 tablet by mouth 4 (Four) Times a Day As Needed for Muscle Spasms.     metroNIDAZOLE 500 MG tablet  Commonly known as: FLAGYL  Take 1 tablet by mouth 2 (Two) Times a Day.               Where to Get Your Medications        These medications were sent to [facility] [address] [place] [facility] - [facility] [place] - [address] - [phone]  - [phone] FX  [address] [facility] [place] [address]      Phone: [phone]   meloxicam 15 MG tablet  methocarbamol 750 MG tablet  metroNIDAZOLE 500 MG tablet            Bang Meyers MD  03/17/24 9533

## 2024-03-17 NOTE — DISCHARGE INSTRUCTIONS
May use ice or heat to sore areas.  May use Salonpas pain patch to painful area.  May also use Tylenol for pain.  Follow-up with primary provider, return new or worsening symptoms

## 2024-05-10 ENCOUNTER — APPOINTMENT (OUTPATIENT)
Dept: GENERAL RADIOLOGY | Facility: HOSPITAL | Age: 24
End: 2024-05-10
Payer: MEDICAID

## 2024-05-10 ENCOUNTER — HOSPITAL ENCOUNTER (EMERGENCY)
Facility: HOSPITAL | Age: 24
Discharge: HOME | End: 2024-05-10
Attending: EMERGENCY MEDICINE
Payer: MEDICAID

## 2024-05-10 VITALS
BODY MASS INDEX: 33.74 KG/M2 | TEMPERATURE: 97.3 F | WEIGHT: 215 LBS | RESPIRATION RATE: 18 BRPM | SYSTOLIC BLOOD PRESSURE: 167 MMHG | HEIGHT: 67 IN | OXYGEN SATURATION: 100 % | DIASTOLIC BLOOD PRESSURE: 117 MMHG | HEART RATE: 106 BPM

## 2024-05-10 DIAGNOSIS — J18.9 PNEUMONIA OF LEFT LOWER LOBE DUE TO INFECTIOUS ORGANISM: Primary | ICD-10-CM

## 2024-05-10 LAB
FLUAV RNA RESP QL NAA+PROBE: NOT DETECTED
FLUBV RNA RESP QL NAA+PROBE: NOT DETECTED
RSV RNA RESP QL NAA+PROBE: NOT DETECTED
SARS-COV-2 RNA RESP QL NAA+PROBE: NOT DETECTED

## 2024-05-10 PROCEDURE — 71045 X-RAY EXAM CHEST 1 VIEW: CPT

## 2024-05-10 PROCEDURE — 99283 EMERGENCY DEPT VISIT LOW MDM: CPT

## 2024-05-10 PROCEDURE — 87637 SARSCOV2&INF A&B&RSV AMP PRB: CPT | Performed by: EMERGENCY MEDICINE

## 2024-05-10 RX ORDER — DOXYCYCLINE 100 MG/1
100 CAPSULE ORAL 2 TIMES DAILY
Qty: 14 CAPSULE | Refills: 0 | Status: SHIPPED | OUTPATIENT
Start: 2024-05-10 | End: 2024-05-11

## 2024-05-10 RX ORDER — ALBUTEROL SULFATE 90 UG/1
2 AEROSOL, METERED RESPIRATORY (INHALATION) EVERY 4 HOURS PRN
Qty: 8 G | Refills: 0 | Status: SHIPPED | OUTPATIENT
Start: 2024-05-10 | End: 2024-05-11

## 2024-05-11 RX ORDER — ALBUTEROL SULFATE 90 UG/1
2 AEROSOL, METERED RESPIRATORY (INHALATION) EVERY 4 HOURS PRN
Qty: 8 G | Refills: 0 | Status: SHIPPED | OUTPATIENT
Start: 2024-05-11

## 2024-05-11 RX ORDER — DOXYCYCLINE 100 MG/1
100 CAPSULE ORAL 2 TIMES DAILY
Qty: 14 CAPSULE | Refills: 0 | Status: SHIPPED | OUTPATIENT
Start: 2024-05-11 | End: 2024-05-11

## 2024-05-11 RX ORDER — DOXYCYCLINE 100 MG/1
100 CAPSULE ORAL 2 TIMES DAILY
Qty: 14 CAPSULE | Refills: 0 | Status: SHIPPED | OUTPATIENT
Start: 2024-05-11

## 2024-05-11 RX ORDER — ALBUTEROL SULFATE 90 UG/1
2 AEROSOL, METERED RESPIRATORY (INHALATION) EVERY 4 HOURS PRN
Qty: 8 G | Refills: 0 | Status: SHIPPED | OUTPATIENT
Start: 2024-05-11 | End: 2024-05-11

## 2024-05-11 NOTE — ED PROVIDER NOTES
"Subjective   History of Present Illness  23-year-old female states had some nasal congestion and cough over the last 3 days.  She reports no fevers chills or chest pains.  She reports no known ill contacts.  States she does feel mildly short of breath.  She reports no hemoptysis.  Review of Systems  Negative for vomiting or diarrhea or abdominal pain or chest pain.  No reported stiff neck or photophobia or headache or ear pain  Past Medical History:   Diagnosis Date    Chlamydia     Gestational hypertension        No Known Allergies    Past Surgical History:   Procedure Laterality Date    WISDOM TOOTH EXTRACTION         Family History   Problem Relation Age of Onset    No Known Problems Mother     No Known Problems Father        Social History     Socioeconomic History    Marital status: Single   Tobacco Use    Smoking status: Every Day     Current packs/day: 0.25     Average packs/day: 0.3 packs/day for 2.0 years (0.5 ttl pk-yrs)     Types: Cigarettes     Passive exposure: Never    Smokeless tobacco: Never   Vaping Use    Vaping status: Never Used   Substance and Sexual Activity    Alcohol use: Not Currently    Drug use: Never    Sexual activity: Defer       Prior to Admission medications    Medication Sig Start Date End Date Taking? Authorizing Provider   Etonogestrel (NEXPLANON SC) Inject  under the skin into the appropriate area as directed.    Provider, MD Ashley   meloxicam (Mobic) 15 MG tablet Take 1 tablet by mouth Daily. Prn pain 3/17/24   Bang Meyers MD   methocarbamol (ROBAXIN) 750 MG tablet Take 1 tablet by mouth 4 (Four) Times a Day As Needed for Muscle Spasms. 3/17/24   Bang Meyers MD   metroNIDAZOLE (FLAGYL) 500 MG tablet Take 1 tablet by mouth 2 (Two) Times a Day. 3/17/24   Bang Meyers MD     BP (!) 167/117 (BP Location: Right arm, Patient Position: Sitting)   Pulse 106   Temp 97.3 °F (36.3 °C)   Resp 18   Ht 170.2 cm (67\")   Wt 97.5 kg (215 lb)   SpO2 100%   BMI 33.67 " kg/m²       Objective   Physical Exam  General: Well-developed well-appearing, no acute distress, alert and appropriate  Eyes:  sclera nonicteric  HEENT: Mucous membranes moist, no mucosal swelling  Neck: Supple, no nuchal rigidity,   Respirations: Respirations nonlabored, equal breath sounds bilaterally, clear lungs  Heart regular rate and rhythm, no murmurs rubs or gallops,   Abdomen soft nontender nondistended,   Extremities no clubbing cyanosis or edema, calves are symmetric and nontender  Neuro cranial nerves grossly intact, no focal limb deficits  Psych oriented, pleasant affect  Skin no rash, brisk cap refill  Procedures           ED Course      Results for orders placed or performed during the hospital encounter of 05/10/24   COVID-19, FLU A/B, RSV PCR 1 HR TAT - Swab, Nasopharynx    Specimen: Nasopharynx; Swab   Result Value Ref Range    COVID19 Not Detected Not Detected - Ref. Range    Influenza A PCR Not Detected Not Detected    Influenza B PCR Not Detected Not Detected    RSV, PCR Not Detected Not Detected     XR Chest 1 View    Result Date: 5/10/2024  Impression: Mild left lower lobe airspace disease suggestive of early pneumonia. Electronically Signed: Sherif Yun MD  5/10/2024 8:12 PM EDT  Workstation ID: [id number]                                          Medical Decision Making  Patient well-appearing with oxygen saturation 100% and in no respiratory distress.  X-ray suggestive of some slight pneumonia.  She was prescribed doxycycline.  She was advised of findings and agreeable plan.  She is also prescribed albuterol inhaler.  She is encouraged to follow-up with her primary care for recheck and was given warning signs for return.      Problems Addressed:  Pneumonia of left lower lobe due to infectious organism: complicated acute illness or injury    Amount and/or Complexity of Data Reviewed  Labs: ordered. Decision-making details documented in ED Course.     Details: COVID-negative  Radiology:  ordered and independent interpretation performed.     Details: My independent interpretation of x-ray image of the chest, subtle possible early developing pneumonia    Risk  Prescription drug management.        Final diagnoses:   Pneumonia of left lower lobe due to infectious organism       ED Disposition  ED Disposition       ED Disposition   Discharge    Condition   Stable    Comment   --               Gilda Fong, EFRAIN  [address]  [address]  [place] [facility]  [phone]    Schedule an appointment as soon as possible for a visit in 1 week           Medication List        New Prescriptions      albuterol sulfate  (90 Base) MCG/ACT inhaler  Commonly known as: PROVENTIL HFA;VENTOLIN HFA;PROAIR HFA  Inhale 2 puffs Every 4 (Four) Hours As Needed for Wheezing or Shortness of Air.     doxycycline 100 MG capsule  Commonly known as: MONODOX  Take 1 capsule by mouth 2 (Two) Times a Day.               Where to Get Your Medications        These medications were sent to [facility] [address] [place] [address]      Hours: Monday to Friday 7 AM to 7 PM Phone: [phone]   albuterol sulfate  (90 Base) MCG/ACT inhaler  doxycycline 100 MG capsule            Praful Allen MD  05/10/24 9597

## 2024-05-11 NOTE — DISCHARGE INSTRUCTIONS
Rest, drink plenty of fluids, start doxycycline.  Albuterol inhaler as needed.  Return for increased shortness of breath, high fever, persistent vomiting or any other concerns.

## 2024-05-31 ENCOUNTER — OFFICE VISIT (OUTPATIENT)
Dept: FAMILY MEDICINE CLINIC | Facility: CLINIC | Age: 24
End: 2024-05-31
Payer: MEDICAID

## 2024-05-31 VITALS
BODY MASS INDEX: 35 KG/M2 | HEIGHT: 67 IN | DIASTOLIC BLOOD PRESSURE: 80 MMHG | OXYGEN SATURATION: 100 % | WEIGHT: 223 LBS | HEART RATE: 89 BPM | SYSTOLIC BLOOD PRESSURE: 124 MMHG

## 2024-05-31 DIAGNOSIS — J18.9 PNEUMONIA OF LEFT LOWER LOBE DUE TO INFECTIOUS ORGANISM: Primary | ICD-10-CM

## 2024-05-31 PROCEDURE — 1160F RVW MEDS BY RX/DR IN RCRD: CPT | Performed by: NURSE PRACTITIONER

## 2024-05-31 PROCEDURE — 99214 OFFICE O/P EST MOD 30 MIN: CPT | Performed by: NURSE PRACTITIONER

## 2024-05-31 PROCEDURE — 1159F MED LIST DOCD IN RCRD: CPT | Performed by: NURSE PRACTITIONER

## 2024-05-31 NOTE — ASSESSMENT & PLAN NOTE
Reviewed ER notes  Reviewed CXR  Sx has improved significantly but still using Albuterol PRN  Repeat CXR  If respiratory sx persist consider PFT  Encouraged smoking cessation

## 2024-05-31 NOTE — PROGRESS NOTES
"Chief Complaint  Follow-up (Follow up from ER visit pneumonia )    Subjective        Mame Singh presents to [facility]  History of Present Illness    Patient presents for follow-up visit regarding recent ER stay.  She presented to [facility] on 5/10 with complaints of nasal congestion and cough.  Chest x-ray showed mild left lower lobe airspace disease suggestive of early pneumonia.  Patient was treated with Doxycycline and albuterol. She has finished Doxycycline. Patient reports sx much improved. She has intermittent cough without chest pain, dyspnea or wheezing.     Objective   Vital Signs:  /80 (BP Location: Left arm, Patient Position: Sitting, Cuff Size: Large Adult)   Pulse 89   Ht 170.2 cm (67\")   Wt 101 kg (223 lb)   SpO2 100%   BMI 34.93 kg/m²   Estimated body mass index is 34.93 kg/m² as calculated from the following:    Height as of this encounter: 170.2 cm (67\").    Weight as of this encounter: 101 kg (223 lb).           Physical Exam  Constitutional:       Appearance: Normal appearance.   HENT:      Head: Normocephalic.   Cardiovascular:      Rate and Rhythm: Normal rate and regular rhythm.   Pulmonary:      Effort: Pulmonary effort is normal.      Breath sounds: Normal breath sounds.   Abdominal:      General: Abdomen is flat. Bowel sounds are normal.      Palpations: Abdomen is soft.   Musculoskeletal:         General: Normal range of motion.      Cervical back: Neck supple.      Right lower leg: No edema.      Left lower leg: No edema.   Skin:     General: Skin is warm and dry.   Neurological:      Mental Status: She is alert and oriented to person, place, and time.      Gait: Gait is intact.   Psychiatric:         Attention and Perception: Attention normal.         Mood and Affect: Mood normal.         Speech: Speech normal.        Result Review :        Data reviewed : Radiologic studies CXR and Recent hospitalization notes [facility] " ER             Assessment and Plan     Diagnoses and all orders for this visit:    1. Pneumonia of left lower lobe due to infectious organism (Primary)  Assessment & Plan:  Reviewed ER notes  Reviewed CXR  Sx has improved significantly but still using Albuterol PRN  Repeat CXR  If respiratory sx persist consider PFT  Encouraged smoking cessation    Orders:  -     XR Chest 2 View           I spent 30 minutes caring for Mame on this date of service. This time includes time spent by me in the following activities:preparing for the visit, reviewing tests, obtaining and/or reviewing a separately obtained history, performing a medically appropriate examination and/or evaluation , counseling and educating the patient/family/caregiver, ordering medications, tests, or procedures, documenting information in the medical record, and care coordination  Follow Up     Return if symptoms worsen or fail to improve.  Patient was given instructions and counseling regarding her condition or for health maintenance advice. Please see specific information pulled into the AVS if appropriate.

## 2024-07-02 ENCOUNTER — TELEPHONE (OUTPATIENT)
Dept: FAMILY MEDICINE CLINIC | Facility: CLINIC | Age: 24
End: 2024-07-02
Payer: MEDICAID

## 2024-07-02 NOTE — TELEPHONE ENCOUNTER
LVM regarding chest XR order to follow up on pneumonia. Inquiring if still wanting to complete or has been completed    relay

## 2024-07-03 ENCOUNTER — APPOINTMENT (OUTPATIENT)
Dept: CT IMAGING | Facility: HOSPITAL | Age: 24
End: 2024-07-03
Payer: MEDICAID

## 2024-07-03 ENCOUNTER — HOSPITAL ENCOUNTER (EMERGENCY)
Facility: HOSPITAL | Age: 24
Discharge: HOME | End: 2024-07-03
Payer: MEDICAID

## 2024-07-03 VITALS
HEIGHT: 67 IN | HEART RATE: 99 BPM | BODY MASS INDEX: 34.53 KG/M2 | WEIGHT: 220 LBS | TEMPERATURE: 98.7 F | SYSTOLIC BLOOD PRESSURE: 152 MMHG | OXYGEN SATURATION: 99 % | DIASTOLIC BLOOD PRESSURE: 98 MMHG | RESPIRATION RATE: 17 BRPM

## 2024-07-03 DIAGNOSIS — S10.93XA CONTUSION OF NECK, INITIAL ENCOUNTER: ICD-10-CM

## 2024-07-03 DIAGNOSIS — S09.90XA MINOR CLOSED HEAD INJURY: ICD-10-CM

## 2024-07-03 DIAGNOSIS — S16.1XXA STRAIN OF NECK MUSCLE, INITIAL ENCOUNTER: ICD-10-CM
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
## 2024-07-03 PROCEDURE — 99284 EMERGENCY DEPT VISIT MOD MDM: CPT

## 2024-07-03 PROCEDURE — 70450 CT HEAD/BRAIN W/O DYE: CPT

## 2024-07-03 RX ORDER — ACETAMINOPHEN 500 MG
1000 TABLET ORAL ONCE
Status: COMPLETED | OUTPATIENT
Start: 2024-07-03 | End: 2024-07-03

## 2024-07-03 RX ORDER — IBUPROFEN 600 MG/1
600 TABLET ORAL ONCE
Status: COMPLETED | OUTPATIENT
Start: 2024-07-03 | End: 2024-07-03

## 2024-07-03 RX ADMIN — ACETAMINOPHEN 1000 MG: 500 TABLET, FILM COATED ORAL at 05:04

## 2024-07-03 RX ADMIN — IBUPROFEN 600 MG: 600 TABLET, FILM COATED ORAL at 05:04

## 2024-07-03 NOTE — Clinical Note
[facility] EMERGENCY DEPARTMENT  [address] [place] [address]  Phone: [phone]    Mame Singh was seen and treated in our emergency department on 7/3/2024.  She may return to work on 07/04/2024.         Thank you for choosing [facility].    Fanny Don, APRN
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
## 2024-07-03 NOTE — DISCHARGE INSTRUCTIONS
Follow head injury precautions and follow-up with primary care for any further problems    Use Tylenol and Motrin as needed for pain at home    Return if worse

## 2024-07-26 ENCOUNTER — TELEPHONE (OUTPATIENT)
Dept: FAMILY MEDICINE CLINIC | Facility: CLINIC | Age: 24
End: 2024-07-26
Payer: MEDICAID

## 2024-07-26 NOTE — TELEPHONE ENCOUNTER
Caller: LUCHO    Relationship: Network    Best call back number:     [phone]       What is the best time to reach you: ANY    Who are you requesting to speak with (clinical staff, provider,  specific staff member): PCP    Do you know the name of the person who called:     What was the call regarding: PATIENT WAS ADMITTED IN [facility] ON 7/24/24 PENDING DISCHARGE ON 7/30/24    Is it okay if the provider responds through MyChart:

## 2024-12-06 ENCOUNTER — TELEPHONE (OUTPATIENT)
Dept: FAMILY MEDICINE CLINIC | Facility: CLINIC | Age: 24
End: 2024-12-06
Payer: MEDICAID

## 2024-12-06 NOTE — TELEPHONE ENCOUNTER
We do not do allergy testing here. we can see her in the office so that we can attach a referral to advanced ENT or family allergy and asthma.  Our first available established appointment is in February?  She could also try calling 1 of those offices to see if they will schedule her without a referral

## 2024-12-06 NOTE — TELEPHONE ENCOUNTER
Caller: Mame Singh    Relationship: Self    Best call back number:     Francisco Mame (Self) [phone] (Mobile)       What is the medical concern/diagnosis: WANTS TO HAVE AN ALLERGY TEST DONE     What specialty or service is being requested: CAN YOU ADVISE     THE SOONEST AVAILABLE APPT WITH PCP IS IN FEBRUARY

## 2024-12-20 ENCOUNTER — OFFICE VISIT (OUTPATIENT)
Dept: FAMILY MEDICINE CLINIC | Facility: CLINIC | Age: 24
End: 2024-12-20
Payer: MEDICAID

## 2024-12-20 VITALS
OXYGEN SATURATION: 98 % | DIASTOLIC BLOOD PRESSURE: 80 MMHG | BODY MASS INDEX: 33.12 KG/M2 | SYSTOLIC BLOOD PRESSURE: 118 MMHG | HEIGHT: 67 IN | HEART RATE: 100 BPM | WEIGHT: 211 LBS

## 2024-12-20 DIAGNOSIS — J30.1 NON-SEASONAL ALLERGIC RHINITIS DUE TO POLLEN: Primary | ICD-10-CM

## 2024-12-20 PROCEDURE — 1160F RVW MEDS BY RX/DR IN RCRD: CPT | Performed by: NURSE PRACTITIONER

## 2024-12-20 PROCEDURE — 99213 OFFICE O/P EST LOW 20 MIN: CPT | Performed by: NURSE PRACTITIONER

## 2024-12-20 PROCEDURE — 1159F MED LIST DOCD IN RCRD: CPT | Performed by: NURSE PRACTITIONER

## 2024-12-20 RX ORDER — TRAZODONE HYDROCHLORIDE 50 MG/1
TABLET, FILM COATED ORAL
COMMUNITY
Start: 2024-11-18

## 2024-12-20 RX ORDER — VORTIOXETINE 10 MG/1
TABLET, FILM COATED ORAL
COMMUNITY
Start: 2024-11-18

## 2024-12-20 RX ORDER — LORATADINE 10 MG/1
10 TABLET ORAL DAILY
Qty: 30 TABLET | Refills: 1 | Status: SHIPPED | OUTPATIENT
Start: 2024-12-20

## 2024-12-20 RX ORDER — FLUTICASONE PROPIONATE 50 MCG
2 SPRAY, SUSPENSION (ML) NASAL DAILY
Qty: 1 G | Refills: 2 | Status: SHIPPED | OUTPATIENT
Start: 2024-12-20

## 2024-12-20 NOTE — PROGRESS NOTES
"Chief Complaint  Allergies (Would like to discuss options for treatment and possible testing )    Subjective        Mame Singh presents to [facility]  History of Present Illness    Patient presents with concerns of increased allergies, wanting to discuss treatment options. She describes watery and swollen eyes, runny nose, sneezing. Patient is taking Benadryl PRN.     Objective   Vital Signs:  /80 (BP Location: Left arm, Patient Position: Sitting, Cuff Size: Large Adult)   Pulse 100   Ht 170.2 cm (67\")   Wt 95.7 kg (211 lb)   SpO2 98%   BMI 33.05 kg/m²   Estimated body mass index is 33.05 kg/m² as calculated from the following:    Height as of this encounter: 170.2 cm (67\").    Weight as of this encounter: 95.7 kg (211 lb).          Physical Exam  Constitutional:       Appearance: Normal appearance.   HENT:      Head: Normocephalic.      Right Ear: Tympanic membrane normal.      Left Ear: Tympanic membrane normal.      Nose: Rhinorrhea present.      Mouth/Throat:      Pharynx: Oropharynx is clear.   Cardiovascular:      Rate and Rhythm: Normal rate and regular rhythm.   Pulmonary:      Effort: Pulmonary effort is normal.      Breath sounds: Normal breath sounds.   Abdominal:      General: Abdomen is flat. Bowel sounds are normal.      Palpations: Abdomen is soft.   Musculoskeletal:         General: Normal range of motion.      Cervical back: Neck supple.      Right lower leg: No edema.      Left lower leg: No edema.   Skin:     General: Skin is warm and dry.   Neurological:      Mental Status: She is alert and oriented to person, place, and time.      Gait: Gait is intact.   Psychiatric:         Attention and Perception: Attention normal.         Mood and Affect: Mood normal.         Speech: Speech normal.        Result Review :                Assessment and Plan   Diagnoses and all orders for this visit:    1. Non-seasonal allergic rhinitis due to pollen (Primary)  -  "    loratadine (Claritin) 10 MG tablet; Take 1 tablet by mouth Daily.  Dispense: 30 tablet; Refill: 1  -     fluticasone (FLONASE) 50 MCG/ACT nasal spray; Administer 2 sprays into the nostril(s) as directed by provider Daily.  Dispense: 1 g; Refill: 2      Start Claritin 10 mg each morning  Start Flonase nasal spray, 2 sprays each nostril daily  If symptoms persist despite treatment, discussed referral for allergist     I spent 25 minutes caring for Mame on this date of service. This time includes time spent by me in the following activities:preparing for the visit, obtaining and/or reviewing a separately obtained history, performing a medically appropriate examination and/or evaluation , counseling and educating the patient/family/caregiver, ordering medications, tests, or procedures, documenting information in the medical record, and care coordination  Follow Up   Return in about 6 months (around 6/20/2025) for Annual physical.  Patient was given instructions and counseling regarding her condition or for health maintenance advice. Please see specific information pulled into the AVS if appropriate.

## 2025-03-20 ENCOUNTER — TELEPHONE (OUTPATIENT)
Dept: FAMILY MEDICINE CLINIC | Facility: CLINIC | Age: 25
End: 2025-03-20
Payer: MEDICAID

## 2025-03-20 RX ORDER — AMOXICILLIN 500 MG/1
500 CAPSULE ORAL 2 TIMES DAILY
Qty: 20 CAPSULE | Refills: 0 | Status: SHIPPED | OUTPATIENT
Start: 2025-03-20 | End: 2025-03-30

## 2025-03-20 NOTE — TELEPHONE ENCOUNTER
I will send over amoxicillin for her to start taking, assuming she has someone that can get her the medication?  I would recommend gargling with  salt water and taking ibuprofen as needed.  If swelling worsens or becomes difficult to swallow, she should proceed to ER

## 2025-03-20 NOTE — TELEPHONE ENCOUNTER
Pt called in with concern of white spots in throat, uvula swollen, hard to swallow, no fever. Pt states symptoms started Monday, is not taking any otc medicine. Pt is unable to make an appt today due to having medical transportation that requires 48 hour notification

## 2025-07-08 ENCOUNTER — HOSPITAL ENCOUNTER (EMERGENCY)
Facility: HOSPITAL | Age: 25
Discharge: HOME | End: 2025-07-08
Attending: EMERGENCY MEDICINE | Admitting: EMERGENCY MEDICINE
Payer: OTHER GOVERNMENT

## 2025-07-08 ENCOUNTER — APPOINTMENT (OUTPATIENT)
Dept: CT IMAGING | Facility: HOSPITAL | Age: 25
End: 2025-07-08
Payer: OTHER GOVERNMENT

## 2025-07-08 ENCOUNTER — APPOINTMENT (OUTPATIENT)
Dept: GENERAL RADIOLOGY | Facility: HOSPITAL | Age: 25
End: 2025-07-08
Payer: OTHER GOVERNMENT

## 2025-07-08 VITALS
DIASTOLIC BLOOD PRESSURE: 77 MMHG | RESPIRATION RATE: 16 BRPM | TEMPERATURE: 99.2 F | HEART RATE: 101 BPM | HEIGHT: 67 IN | OXYGEN SATURATION: 95 % | SYSTOLIC BLOOD PRESSURE: 130 MMHG | BODY MASS INDEX: 35.05 KG/M2 | WEIGHT: 223.33 LBS

## 2025-07-08 DIAGNOSIS — J02.9 PHARYNGITIS, UNSPECIFIED ETIOLOGY: Primary | ICD-10-CM

## 2025-07-08 DIAGNOSIS — R07.9 CHEST PAIN, UNSPECIFIED TYPE: ICD-10-CM

## 2025-07-08 DIAGNOSIS — J40 BRONCHITIS: ICD-10-CM

## 2025-07-08 LAB
ALBUMIN SERPL-MCNC: 4.5 G/DL (ref 3.5–5.2)
ALBUMIN/GLOB SERPL: 1.3 G/DL
ALP SERPL-CCNC: 131 U/L (ref 39–117)
ALT SERPL W P-5'-P-CCNC: 29 U/L (ref 1–33)
ANION GAP SERPL CALCULATED.3IONS-SCNC: 13 MMOL/L (ref 5–15)
APTT PPP: 31.3 SECONDS (ref 22.7–35.4)
AST SERPL-CCNC: 25 U/L (ref 1–32)
B PARAPERT DNA SPEC QL NAA+PROBE: NOT DETECTED
B PERT DNA SPEC QL NAA+PROBE: NOT DETECTED
BASOPHILS # BLD AUTO: 0.06 10*3/MM3 (ref 0–0.2)
BASOPHILS NFR BLD AUTO: 0.3 % (ref 0–1.5)
BILIRUB SERPL-MCNC: 0.3 MG/DL (ref 0–1.2)
BUN SERPL-MCNC: 6.9 MG/DL (ref 6–20)
BUN/CREAT SERPL: 8 (ref 7–25)
C PNEUM DNA NPH QL NAA+NON-PROBE: NOT DETECTED
CALCIUM SPEC-SCNC: 9.7 MG/DL (ref 8.6–10.5)
CHLORIDE SERPL-SCNC: 102 MMOL/L (ref 98–107)
CO2 SERPL-SCNC: 22 MMOL/L (ref 22–29)
CREAT SERPL-MCNC: 0.86 MG/DL (ref 0.57–1)
D DIMER PPP FEU-MCNC: 0.99 MCGFEU/ML (ref 0–0.5)
DEPRECATED RDW RBC AUTO: 42.5 FL (ref 37–54)
EGFRCR SERPLBLD CKD-EPI 2021: 97.5 ML/MIN/1.73
EOSINOPHIL # BLD AUTO: 0.3 10*3/MM3 (ref 0–0.4)
EOSINOPHIL NFR BLD AUTO: 1.4 % (ref 0.3–6.2)
ERYTHROCYTE [DISTWIDTH] IN BLOOD BY AUTOMATED COUNT: 13.3 % (ref 12.3–15.4)
FLUAV SUBTYP SPEC NAA+PROBE: NOT DETECTED
FLUBV RNA NPH QL NAA+NON-PROBE: NOT DETECTED
GEN 5 1HR TROPONIN T REFLEX: <6 NG/L
GLOBULIN UR ELPH-MCNC: 3.4 GM/DL
GLUCOSE SERPL-MCNC: 105 MG/DL (ref 65–99)
HADV DNA SPEC NAA+PROBE: NOT DETECTED
HCOV 229E RNA SPEC QL NAA+PROBE: NOT DETECTED
HCOV HKU1 RNA SPEC QL NAA+PROBE: NOT DETECTED
HCOV NL63 RNA SPEC QL NAA+PROBE: NOT DETECTED
HCOV OC43 RNA SPEC QL NAA+PROBE: NOT DETECTED
HCT VFR BLD AUTO: 40.6 % (ref 34–46.6)
HGB BLD-MCNC: 13 G/DL (ref 12–15.9)
HMPV RNA NPH QL NAA+NON-PROBE: NOT DETECTED
HPIV1 RNA ISLT QL NAA+PROBE: NOT DETECTED
HPIV2 RNA SPEC QL NAA+PROBE: NOT DETECTED
HPIV3 RNA NPH QL NAA+PROBE: NOT DETECTED
HPIV4 P GENE NPH QL NAA+PROBE: NOT DETECTED
IMM GRANULOCYTES # BLD AUTO: 0.11 10*3/MM3 (ref 0–0.05)
IMM GRANULOCYTES NFR BLD AUTO: 0.5 % (ref 0–0.5)
INR PPP: 1.06 (ref 0.9–1.1)
LYMPHOCYTES # BLD AUTO: 2.32 10*3/MM3 (ref 0.7–3.1)
LYMPHOCYTES NFR BLD AUTO: 10.4 % (ref 19.6–45.3)
M PNEUMO IGG SER IA-ACNC: NOT DETECTED
MCH RBC QN AUTO: 27.7 PG (ref 26.6–33)
MCHC RBC AUTO-ENTMCNC: 32 G/DL (ref 31.5–35.7)
MCV RBC AUTO: 86.4 FL (ref 79–97)
MONOCYTES # BLD AUTO: 1.28 10*3/MM3 (ref 0.1–0.9)
MONOCYTES NFR BLD AUTO: 5.8 % (ref 5–12)
NEUTROPHILS NFR BLD AUTO: 18.15 10*3/MM3 (ref 1.7–7)
NEUTROPHILS NFR BLD AUTO: 81.6 % (ref 42.7–76)
NRBC BLD AUTO-RTO: 0 /100 WBC (ref 0–0.2)
PLATELET # BLD AUTO: 302 10*3/MM3 (ref 140–450)
PMV BLD AUTO: 8.9 FL (ref 6–12)
POTASSIUM SERPL-SCNC: 3.9 MMOL/L (ref 3.5–5.2)
PROT SERPL-MCNC: 7.9 G/DL (ref 6–8.5)
PROTHROMBIN TIME: 13.7 SECONDS (ref 11.7–14.2)
QT INTERVAL: 283 MS
QTC INTERVAL: 407 MS
RBC # BLD AUTO: 4.7 10*6/MM3 (ref 3.77–5.28)
RHINOVIRUS RNA SPEC NAA+PROBE: DETECTED
RSV RNA NPH QL NAA+NON-PROBE: NOT DETECTED
SARS-COV-2 RNA RESP QL NAA+PROBE: NOT DETECTED
SODIUM SERPL-SCNC: 137 MMOL/L (ref 136–145)
TROPONIN T NUMERIC DELTA: NORMAL
TROPONIN T SERPL HS-MCNC: <6 NG/L
WBC NRBC COR # BLD AUTO: 22.22 10*3/MM3 (ref 3.4–10.8)

## 2025-07-08 PROCEDURE — 80053 COMPREHEN METABOLIC PANEL: CPT | Performed by: EMERGENCY MEDICINE

## 2025-07-08 PROCEDURE — 85610 PROTHROMBIN TIME: CPT | Performed by: EMERGENCY MEDICINE

## 2025-07-08 PROCEDURE — 99285 EMERGENCY DEPT VISIT HI MDM: CPT

## 2025-07-08 PROCEDURE — 94640 AIRWAY INHALATION TREATMENT: CPT

## 2025-07-08 PROCEDURE — 36415 COLL VENOUS BLD VENIPUNCTURE: CPT

## 2025-07-08 PROCEDURE — 25510000001 IOPAMIDOL PER 1 ML: Performed by: EMERGENCY MEDICINE

## 2025-07-08 PROCEDURE — 94799 UNLISTED PULMONARY SVC/PX: CPT

## 2025-07-08 PROCEDURE — 85379 FIBRIN DEGRADATION QUANT: CPT | Performed by: EMERGENCY MEDICINE

## 2025-07-08 PROCEDURE — 71045 X-RAY EXAM CHEST 1 VIEW: CPT

## 2025-07-08 PROCEDURE — 71275 CT ANGIOGRAPHY CHEST: CPT

## 2025-07-08 PROCEDURE — 25810000003 SODIUM CHLORIDE 0.9 % SOLUTION: Performed by: EMERGENCY MEDICINE

## 2025-07-08 PROCEDURE — 93005 ELECTROCARDIOGRAM TRACING: CPT | Performed by: EMERGENCY MEDICINE

## 2025-07-08 PROCEDURE — 96374 THER/PROPH/DIAG INJ IV PUSH: CPT

## 2025-07-08 PROCEDURE — 25010000002 KETOROLAC TROMETHAMINE PER 15 MG: Performed by: EMERGENCY MEDICINE

## 2025-07-08 PROCEDURE — 93005 ELECTROCARDIOGRAM TRACING: CPT

## 2025-07-08 PROCEDURE — 0202U NFCT DS 22 TRGT SARS-COV-2: CPT | Performed by: EMERGENCY MEDICINE

## 2025-07-08 PROCEDURE — 84484 ASSAY OF TROPONIN QUANT: CPT | Performed by: EMERGENCY MEDICINE

## 2025-07-08 PROCEDURE — 85025 COMPLETE CBC W/AUTO DIFF WBC: CPT | Performed by: EMERGENCY MEDICINE

## 2025-07-08 PROCEDURE — 85730 THROMBOPLASTIN TIME PARTIAL: CPT | Performed by: EMERGENCY MEDICINE

## 2025-07-08 RX ORDER — IOPAMIDOL 755 MG/ML
100 INJECTION, SOLUTION INTRAVASCULAR
Status: COMPLETED | OUTPATIENT
Start: 2025-07-08 | End: 2025-07-08

## 2025-07-08 RX ORDER — AMOXICILLIN 875 MG/1
875 TABLET, COATED ORAL 2 TIMES DAILY
Qty: 20 TABLET | Refills: 0 | Status: SHIPPED | OUTPATIENT
Start: 2025-07-08 | End: 2025-07-11

## 2025-07-08 RX ORDER — KETOROLAC TROMETHAMINE 30 MG/ML
15 INJECTION, SOLUTION INTRAMUSCULAR; INTRAVENOUS ONCE
Status: COMPLETED | OUTPATIENT
Start: 2025-07-08 | End: 2025-07-08

## 2025-07-08 RX ORDER — ALBUTEROL SULFATE 90 UG/1
2 INHALANT RESPIRATORY (INHALATION) EVERY 4 HOURS PRN
Qty: 6.7 G | Refills: 0 | Status: SHIPPED | OUTPATIENT
Start: 2025-07-08 | End: 2025-07-09

## 2025-07-08 RX ORDER — AMOXICILLIN 875 MG/1
875 TABLET, COATED ORAL ONCE
Status: COMPLETED | OUTPATIENT
Start: 2025-07-08 | End: 2025-07-08

## 2025-07-08 RX ORDER — IPRATROPIUM BROMIDE AND ALBUTEROL SULFATE 2.5; .5 MG/3ML; MG/3ML
3 SOLUTION RESPIRATORY (INHALATION) ONCE
Status: COMPLETED | OUTPATIENT
Start: 2025-07-08 | End: 2025-07-08

## 2025-07-08 RX ORDER — PREDNISONE 20 MG/1
60 TABLET ORAL DAILY
Qty: 15 TABLET | Refills: 0 | Status: SHIPPED | OUTPATIENT
Start: 2025-07-08 | End: 2025-07-11

## 2025-07-08 RX ORDER — SODIUM CHLORIDE 0.9 % (FLUSH) 0.9 %
10 SYRINGE (ML) INJECTION AS NEEDED
Status: DISCONTINUED | OUTPATIENT
Start: 2025-07-08 | End: 2025-07-09 | Stop reason: HOSPADM

## 2025-07-08 RX ORDER — ACETAMINOPHEN 500 MG
1000 TABLET ORAL ONCE
Status: COMPLETED | OUTPATIENT
Start: 2025-07-08 | End: 2025-07-08

## 2025-07-08 RX ORDER — BENZONATATE 200 MG/1
200 CAPSULE ORAL 3 TIMES DAILY PRN
Qty: 30 CAPSULE | Refills: 0 | Status: SHIPPED | OUTPATIENT
Start: 2025-07-08

## 2025-07-08 RX ADMIN — ACETAMINOPHEN 1000 MG: 500 TABLET, FILM COATED ORAL at 20:38

## 2025-07-08 RX ADMIN — SODIUM CHLORIDE 500 ML: 9 INJECTION, SOLUTION INTRAVENOUS at 20:38

## 2025-07-08 RX ADMIN — AMOXICILLIN 875 MG: 875 TABLET, FILM COATED ORAL at 22:38

## 2025-07-08 RX ADMIN — IPRATROPIUM BROMIDE AND ALBUTEROL SULFATE 3 ML: .5; 3 SOLUTION RESPIRATORY (INHALATION) at 21:15

## 2025-07-08 RX ADMIN — IOPAMIDOL 100 ML: 755 INJECTION, SOLUTION INTRAVENOUS at 21:11

## 2025-07-08 RX ADMIN — KETOROLAC TROMETHAMINE 15 MG: 30 INJECTION INTRAMUSCULAR; INTRAVENOUS at 20:24

## 2025-07-08 NOTE — ED TRIAGE NOTES
Pt arrived via [facility] c/o cold symptoms. Pt reports she feels like her chest is congested and has been for approx 4 days.

## 2025-07-09 ENCOUNTER — TELEPHONE (OUTPATIENT)
Dept: FAMILY MEDICINE CLINIC | Facility: CLINIC | Age: 25
End: 2025-07-09
Payer: OTHER GOVERNMENT

## 2025-07-09 LAB
QT INTERVAL: 347 MS
QTC INTERVAL: 442 MS

## 2025-07-09 RX ORDER — ALBUTEROL SULFATE 90 UG/1
2 INHALANT RESPIRATORY (INHALATION) EVERY 4 HOURS PRN
Qty: 18 G | Refills: 0 | Status: SHIPPED | OUTPATIENT
Start: 2025-07-09

## 2025-07-09 NOTE — TELEPHONE ENCOUNTER
I tried to call patient twice by phone. Her appointment had to be moved to 7/11/25 at 11:15.  Sent a my chart message as well.

## 2025-07-09 NOTE — ED PROVIDER NOTES
"Subjective   History of Present Illness  Chief complaint: Patient is a 23-year-old who has had cough and congestion.  She had chest pain and back pain with her cough.  She states \"the cough makes my uvula hurt\" my throat hurts.  She has not had any history of blood clot or blood clotting disorders.  She has not noted a fever.  No history of asthma.  No swelling.  No abdominal pain or vomiting.    Context:    Duration:    Timing:    Severity:    Associated Symptoms:        PCP:  LMP:      Review of Systems   Constitutional:  Negative for fever.   HENT:  Positive for congestion and sore throat.    Respiratory:  Positive for cough.    Cardiovascular:  Positive for chest pain.   Gastrointestinal: Negative.    Genitourinary: Negative.    Musculoskeletal: Negative.    Neurological: Negative.        Past Medical History:   Diagnosis Date    Chlamydia     Gestational hypertension        No Known Allergies    Past Surgical History:   Procedure Laterality Date    WISDOM TOOTH EXTRACTION         Family History   Problem Relation Age of Onset    No Known Problems Mother     No Known Problems Father        Social History     Socioeconomic History    Marital status: Single   Tobacco Use    Smoking status: Every Day     Current packs/day: 0.25     Average packs/day: 0.3 packs/day for 2.0 years (0.5 ttl pk-yrs)     Types: Cigarettes     Passive exposure: Current    Smokeless tobacco: Never   Vaping Use    Vaping status: Never Used   Substance and Sexual Activity    Alcohol use: Not Currently    Drug use: Never    Sexual activity: Defer           Objective   Physical Exam  Vitals and nursing note reviewed.   Constitutional:       General: She is not in acute distress.  HENT:      Head: Normocephalic and atraumatic.      Mouth/Throat:      Mouth: Mucous membranes are moist.      Pharynx: Oropharynx is clear. Posterior oropharyngeal erythema present.   Cardiovascular:      Rate and Rhythm: Regular rhythm. Tachycardia present. "   Pulmonary:      Effort: Pulmonary effort is normal.      Breath sounds: Normal breath sounds.   Abdominal:      Palpations: Abdomen is soft.      Tenderness: There is no abdominal tenderness.   Musculoskeletal:         General: No swelling or tenderness. Normal range of motion.      Cervical back: Normal range of motion. No tenderness.   Skin:     General: Skin is warm and dry.   Neurological:      General: No focal deficit present.      Mental Status: She is alert and oriented to person, place, and time.   Psychiatric:         Mood and Affect: Mood normal.         Procedures           ED Course      CT Angiogram Chest Pulmonary Embolism  Result Date: 7/8/2025  Impression: No evidence of pulmonary embolism. No acute intrathoracic findings. Electronically Signed: Jun Fagan MD  7/8/2025 9:19 PM EDT  Workstation ID: [id number]    XR Chest 1 View  Result Date: 7/8/2025  Impression: No active disease is seen. Electronically Signed: Amanuel Gonzales MD  7/8/2025 8:49 PM EDT  Workstation ID: [id number]                                         Results for orders placed or performed during the hospital encounter of 07/08/25   ECG 12 Lead Chest Pain    Collection Time: 07/08/25  7:47 PM   Result Value Ref Range    QT Interval 283 ms    QTC Interval 407 ms   Respiratory Panel PCR w/COVID-19(SARS-CoV-2) [facility]/GLORIA/[facility]/JAIDEN In-House, NP Swab in UTM/VTM, 2 HR TAT - Swab, Nasopharynx    Collection Time: 07/08/25  8:17 PM    Specimen: Nasopharynx; Swab   Result Value Ref Range    ADENOVIRUS, PCR Not Detected Not Detected    Coronavirus 229E Not Detected Not Detected    Coronavirus HKU1 Not Detected Not Detected    Coronavirus NL63 Not Detected Not Detected    Coronavirus OC43 Not Detected Not Detected    COVID19 Not Detected Not Detected - Ref. Range    Human Metapneumovirus Not Detected Not Detected    Human Rhinovirus/Enterovirus Detected (A) Not Detected    Influenza A PCR Not Detected Not Detected    Influenza B PCR Not  Detected Not Detected    Parainfluenza Virus 1 Not Detected Not Detected    Parainfluenza Virus 2 Not Detected Not Detected    Parainfluenza Virus 3 Not Detected Not Detected    Parainfluenza Virus 4 Not Detected Not Detected    RSV, PCR Not Detected Not Detected    Bordetella pertussis pcr Not Detected Not Detected    Bordetella parapertussis PCR Not Detected Not Detected    Chlamydophila pneumoniae PCR Not Detected Not Detected    Mycoplasma pneumo by PCR Not Detected Not Detected   Comprehensive Metabolic Panel    Collection Time: 07/08/25  8:17 PM    Specimen: Blood   Result Value Ref Range    Glucose 105 (H) 65 - 99 mg/dL    BUN 6.9 6.0 - 20.0 mg/dL    Creatinine 0.86 0.57 - 1.00 mg/dL    Sodium 137 136 - 145 mmol/L    Potassium 3.9 3.5 - 5.2 mmol/L    Chloride 102 98 - 107 mmol/L    CO2 22.0 22.0 - 29.0 mmol/L    Calcium 9.7 8.6 - 10.5 mg/dL    Total Protein 7.9 6.0 - 8.5 g/dL    Albumin 4.5 3.5 - 5.2 g/dL    ALT (SGPT) 29 1 - 33 U/L    AST (SGOT) 25 1 - 32 U/L    Alkaline Phosphatase 131 (H) 39 - 117 U/L    Total Bilirubin 0.3 0.0 - 1.2 mg/dL    Globulin 3.4 gm/dL    A/G Ratio 1.3 g/dL    BUN/Creatinine Ratio 8.0 7.0 - 25.0    Anion Gap 13.0 5.0 - 15.0 mmol/L    eGFR 97.5 >60.0 mL/min/1.73   Protime-INR    Collection Time: 07/08/25  8:17 PM    Specimen: Blood   Result Value Ref Range    Protime 13.7 11.7 - 14.2 Seconds    INR 1.06 0.90 - 1.10   aPTT    Collection Time: 07/08/25  8:17 PM    Specimen: Blood   Result Value Ref Range    PTT 31.3 22.7 - 35.4 seconds   D-dimer, Quantitative    Collection Time: 07/08/25  8:17 PM    Specimen: Blood   Result Value Ref Range    D-Dimer, Quantitative 0.99 (H) 0.00 - 0.50 MCGFEU/mL   High Sensitivity Troponin T    Collection Time: 07/08/25  8:17 PM    Specimen: Blood   Result Value Ref Range    HS Troponin T <6 <14 ng/L   CBC Auto Differential    Collection Time: 07/08/25  8:17 PM    Specimen: Blood   Result Value Ref Range    WBC 22.22 (H) 3.40 - 10.80 10*3/mm3    RBC  4.70 3.77 - 5.28 10*6/mm3    Hemoglobin 13.0 12.0 - 15.9 g/dL    Hematocrit 40.6 34.0 - 46.6 %    MCV 86.4 79.0 - 97.0 fL    MCH 27.7 26.6 - 33.0 pg    MCHC 32.0 31.5 - 35.7 g/dL    RDW 13.3 12.3 - 15.4 %    RDW-SD 42.5 37.0 - 54.0 fl    MPV 8.9 6.0 - 12.0 fL    Platelets 302 140 - 450 10*3/mm3    Neutrophil % 81.6 (H) 42.7 - 76.0 %    Lymphocyte % 10.4 (L) 19.6 - 45.3 %    Monocyte % 5.8 5.0 - 12.0 %    Eosinophil % 1.4 0.3 - 6.2 %    Basophil % 0.3 0.0 - 1.5 %    Immature Grans % 0.5 0.0 - 0.5 %    Neutrophils, Absolute 18.15 (H) 1.70 - 7.00 10*3/mm3    Lymphocytes, Absolute 2.32 0.70 - 3.10 10*3/mm3    Monocytes, Absolute 1.28 (H) 0.10 - 0.90 10*3/mm3    Eosinophils, Absolute 0.30 0.00 - 0.40 10*3/mm3    Basophils, Absolute 0.06 0.00 - 0.20 10*3/mm3    Immature Grans, Absolute 0.11 (H) 0.00 - 0.05 10*3/mm3    nRBC 0.0 0.0 - 0.2 /100 WBC   High Sensitivity Troponin T 1Hr    Collection Time: 07/08/25  9:23 PM    Specimen: Blood   Result Value Ref Range    HS Troponin T <6 <14 ng/L    Troponin T Numeric Delta                   Medical Decision Making  Patient seen evaluated above problem    Differential diagnosis includes but is not limited to PE, dissection, pneumothorax, pneumonia, bronchitis, pharyngitis, sinusitis    Patient with infectious symptoms.  Her chest x-ray showed no signs of pneumothorax or infiltrate.  But she did have a white count of 22.  Her D-dimer was elevated as well.  CT chest does not show PE.  No signs of infiltrates.  She does have significant pharyngeal erythema and potentially strep pharyngitis.  Will treat clinically.  Also patient has rhinovirus infection.  Will provide symptomatic care for the her rhinovirus infection.  EKG initially interpreted by myself as sinus tachycardia with nonspecific T wave changes.  Is a rate came down with treatment 290 7 repeat EKG shows sinus rhythm rate of 97 with no ST elevation or depression.  No signs of myocarditis.  Her troponins were  normal.    Amount and/or Complexity of Data Reviewed  Labs: ordered. Decision-making details documented in ED Course.     Details: Labs reviewed by myself  Radiology: ordered and independent interpretation performed.  ECG/medicine tests: ordered.    Risk  OTC drugs.  Prescription drug management.        Final diagnoses:   None   Pharyngitis  Bronchitis  Rhinovirus   Chest pain      ED Disposition  ED Disposition       None            No follow-up provider specified.       Medication List      No changes were made to your prescriptions during this visit.            Apolinar Kimbrough,   07/08/25 1770

## 2025-07-11 ENCOUNTER — OFFICE VISIT (OUTPATIENT)
Dept: FAMILY MEDICINE CLINIC | Facility: CLINIC | Age: 25
End: 2025-07-11
Payer: OTHER GOVERNMENT

## 2025-07-11 VITALS
DIASTOLIC BLOOD PRESSURE: 78 MMHG | WEIGHT: 224 LBS | BODY MASS INDEX: 35.16 KG/M2 | SYSTOLIC BLOOD PRESSURE: 128 MMHG | HEIGHT: 67 IN | HEART RATE: 128 BPM | OXYGEN SATURATION: 95 % | TEMPERATURE: 99.8 F

## 2025-07-11 DIAGNOSIS — B34.8 RHINOVIRUS: ICD-10-CM

## 2025-07-11 DIAGNOSIS — J40 BRONCHITIS: Primary | ICD-10-CM

## 2025-07-11 RX ORDER — DEXAMETHASONE SODIUM PHOSPHATE 4 MG/ML
4 INJECTION, SOLUTION INTRA-ARTICULAR; INTRALESIONAL; INTRAMUSCULAR; INTRAVENOUS; SOFT TISSUE ONCE
Status: COMPLETED | OUTPATIENT
Start: 2025-07-11 | End: 2025-07-11

## 2025-07-11 RX ORDER — CHLORCYCLIZINE HYDROCHLORIDE AND PSEUDOEPHEDRINE HYDROCHLORIDE 25; 60 MG/1; MG/1
1 TABLET ORAL EVERY 8 HOURS PRN
Qty: 42 TABLET | Refills: 0 | Status: SHIPPED | OUTPATIENT
Start: 2025-07-11

## 2025-07-11 RX ADMIN — DEXAMETHASONE SODIUM PHOSPHATE 4 MG: 4 INJECTION, SOLUTION INTRA-ARTICULAR; INTRALESIONAL; INTRAMUSCULAR; INTRAVENOUS; SOFT TISSUE at 11:35

## 2025-07-11 NOTE — PROGRESS NOTES
"Chief Complaint  Follow-up (Follow up from ER visit )    Gabo Singh presents to [facility]  History of Present Illness    Patient seen at [facility] on July 8 with complaints of cough and congestion, accompanied with chest pain.  Chest x-ray showed no active disease.  CT chest showed no acute PE.  Respiratory panel was positive for human rhinovirus..  Blood cell count was elevated at 22 with an elevated D-dimer.  Patient was treated with amoxicillin, prednisone and albuterol. Patient here today for f/u visit. She still has nasal and chest congestion, pain with inspiration, low grade fever, dyspnea and wheezing.     Objective   Vital Signs:  /78 (BP Location: Left arm, Patient Position: Sitting, Cuff Size: Large Adult)   Pulse (!) 128   Temp 99.8 °F (37.7 °C) (Oral)   Ht 170.2 cm (67\")   Wt 102 kg (224 lb)   SpO2 95%   BMI 35.08 kg/m²   Estimated body mass index is 35.08 kg/m² as calculated from the following:    Height as of this encounter: 170.2 cm (67\").    Weight as of this encounter: 102 kg (224 lb).          Physical Exam  Constitutional:       Appearance: Normal appearance.   HENT:      Head: Normocephalic.   Cardiovascular:      Rate and Rhythm: Regular rhythm. Tachycardia present.   Pulmonary:      Effort: Tachypnea present.      Breath sounds: Normal breath sounds.   Abdominal:      General: Abdomen is flat. Bowel sounds are normal.      Palpations: Abdomen is soft.   Musculoskeletal:         General: Normal range of motion.      Cervical back: Neck supple.      Right lower leg: No edema.      Left lower leg: No edema.   Skin:     General: Skin is warm and dry.   Neurological:      Mental Status: She is alert and oriented to person, place, and time.      Gait: Gait is intact.   Psychiatric:         Attention and Perception: Attention normal.         Mood and Affect: Mood normal.         Speech: Speech normal.        Result Review " :      Data reviewed : Radiologic studies chest x-ray/CT chest and Recent hospitalization notes [facility]          Assessment and Plan   Diagnoses and all orders for this visit:    1. Bronchitis (Primary)  -     dexAMETHasone (DECADRON) injection 4 mg    2. Rhinovirus    Other orders  -     Chlorcyclizine-Pseudoephed (Stahist AD) 25-60 MG tablet; Take 1 tablet by mouth Every 8 (Eight) Hours As Needed (chest congestion).  Dispense: 42 tablet; Refill: 0  -     amoxicillin-clavulanate (AUGMENTIN) 875-125 MG per tablet; Take 1 tablet by mouth 2 (Two) Times a Day.  Dispense: 20 tablet; Refill: 0    Reviewed ER notes and workup  Decadron IM 4 mg x 1  Stahist as needed for congestion  Discontinue amoxicillin  Start Augmentin twice daily x 10 days  Sample Airsupra, 2 puffs every 4 hours as needed  Call Monday if no improvement       I spent 30 minutes caring for Mame on this date of service. This time includes time spent by me in the following activities:preparing for the visit, reviewing tests, obtaining and/or reviewing a separately obtained history, performing a medically appropriate examination and/or evaluation , counseling and educating the patient/family/caregiver, ordering medications, tests, or procedures, documenting information in the medical record, and care coordination  Follow Up   Return if symptoms worsen or fail to improve.  Patient was given instructions and counseling regarding her condition or for health maintenance advice. Please see specific information pulled into the AVS if appropriate.

## 2025-07-11 NOTE — PROGRESS NOTES
Injection  Injection performed in the left ventrogluteal by Shireen Hendricks MA. Patient tolerated the procedure well without complications.  07/11/25   Shireen Hendricks MA